# Patient Record
Sex: FEMALE | Race: WHITE | NOT HISPANIC OR LATINO | Employment: FULL TIME | URBAN - METROPOLITAN AREA
[De-identification: names, ages, dates, MRNs, and addresses within clinical notes are randomized per-mention and may not be internally consistent; named-entity substitution may affect disease eponyms.]

---

## 2017-04-11 ENCOUNTER — CONVERSION ENCOUNTER (OUTPATIENT)
Dept: ENDOCRINOLOGY | Facility: CLINIC | Age: 42
End: 2017-04-11

## 2017-05-24 ENCOUNTER — OFFICE VISIT (OUTPATIENT)
Dept: OBSTETRICS AND GYNECOLOGY | Facility: CLINIC | Age: 42
End: 2017-05-24

## 2017-05-24 VITALS
HEIGHT: 66 IN | DIASTOLIC BLOOD PRESSURE: 84 MMHG | BODY MASS INDEX: 27 KG/M2 | WEIGHT: 168 LBS | SYSTOLIC BLOOD PRESSURE: 120 MMHG

## 2017-05-24 DIAGNOSIS — B00.9 HSV-2 INFECTION: ICD-10-CM

## 2017-05-24 DIAGNOSIS — Z13.9 SCREENING: Primary | ICD-10-CM

## 2017-05-24 DIAGNOSIS — Z30.41 ORAL CONTRACEPTIVE USE: ICD-10-CM

## 2017-05-24 DIAGNOSIS — Z01.419 ENCOUNTER FOR GYNECOLOGICAL EXAMINATION WITHOUT ABNORMAL FINDING: ICD-10-CM

## 2017-05-24 PROBLEM — E10.9 TYPE 1 DIABETES MELLITUS: Status: ACTIVE | Noted: 2017-05-24

## 2017-05-24 LAB

## 2017-05-24 PROCEDURE — 99395 PREV VISIT EST AGE 18-39: CPT | Performed by: OBSTETRICS & GYNECOLOGY

## 2017-05-24 PROCEDURE — 81002 URINALYSIS NONAUTO W/O SCOPE: CPT | Performed by: OBSTETRICS & GYNECOLOGY

## 2017-05-24 PROCEDURE — 81025 URINE PREGNANCY TEST: CPT | Performed by: OBSTETRICS & GYNECOLOGY

## 2017-05-24 RX ORDER — NORGESTIMATE AND ETHINYL ESTRADIOL 7DAYSX3 28
1 KIT ORAL DAILY
Qty: 84 TABLET | Refills: 3 | Status: SHIPPED | OUTPATIENT
Start: 2017-05-24 | End: 2018-05-30 | Stop reason: SDUPTHER

## 2017-05-24 RX ORDER — ATORVASTATIN CALCIUM 10 MG/1
TABLET, FILM COATED ORAL
COMMUNITY
Start: 2017-04-11 | End: 2020-05-11 | Stop reason: SDUPTHER

## 2017-05-24 RX ORDER — NORGESTIMATE AND ETHINYL ESTRADIOL 7DAYSX3 28
KIT ORAL
COMMUNITY
Start: 2017-04-17 | End: 2017-05-24 | Stop reason: SDUPTHER

## 2017-05-26 LAB
CYTOLOGIST CVX/VAG CYTO: NORMAL
CYTOLOGY CVX/VAG DOC THIN PREP: NORMAL
DX ICD CODE: NORMAL
HIV 1 & 2 AB SER-IMP: NORMAL
HPV I/H RISK 1 DNA CVX QL PROBE+SIG AMP: NEGATIVE
OTHER STN SPEC: NORMAL
PATH REPORT.FINAL DX SPEC: NORMAL
STAT OF ADQ CVX/VAG CYTO-IMP: NORMAL

## 2017-06-02 ENCOUNTER — HOSPITAL ENCOUNTER (OUTPATIENT)
Dept: MAMMOGRAPHY | Facility: HOSPITAL | Age: 42
Discharge: HOME | End: 2017-06-02
Attending: OBSTETRICS & GYNECOLOGY | Admitting: OBSTETRICS & GYNECOLOGY

## 2017-06-02 DIAGNOSIS — Z13.9 SCREENING: ICD-10-CM

## 2017-06-02 PROCEDURE — G0202 SCR MAMMO BI INCL CAD: HCPCS

## 2017-06-08 DIAGNOSIS — R92.8 MAMMOGRAM ABNORMAL: Primary | ICD-10-CM

## 2017-06-15 ENCOUNTER — HOSPITAL ENCOUNTER (OUTPATIENT)
Dept: ULTRASOUND IMAGING | Facility: HOSPITAL | Age: 42
End: 2017-06-15
Attending: OBSTETRICS & GYNECOLOGY

## 2017-06-16 ENCOUNTER — TELEPHONE (OUTPATIENT)
Dept: OBSTETRICS AND GYNECOLOGY | Facility: CLINIC | Age: 42
End: 2017-06-16

## 2017-06-18 DIAGNOSIS — R92.8 MAMMOGRAM ABNORMAL: Primary | ICD-10-CM

## 2017-06-23 DIAGNOSIS — R92.8 MAMMOGRAM ABNORMAL: ICD-10-CM

## 2017-07-06 ENCOUNTER — HOSPITAL ENCOUNTER (OUTPATIENT)
Dept: OTHER | Facility: HOSPITAL | Age: 42
Setting detail: SPECIMEN
Discharge: HOME | End: 2017-07-06
Attending: RADIOLOGY | Admitting: RADIOLOGY

## 2017-07-23 ENCOUNTER — TELEPHONE (OUTPATIENT)
Dept: URGENT CARE | Facility: CLINIC | Age: 42
End: 2017-07-23

## 2017-07-23 DIAGNOSIS — N30.90 CYSTITIS: Primary | ICD-10-CM

## 2017-07-23 RX ORDER — CIPROFLOXACIN 500 MG/1
TABLET, FILM COATED ORAL
Qty: 14 TABLET | Refills: 0 | Status: SHIPPED | OUTPATIENT
Start: 2017-07-23 | End: 2017-08-23

## 2017-08-23 ENCOUNTER — OFFICE VISIT (OUTPATIENT)
Dept: SPORTS MEDICINE | Facility: CLINIC | Age: 42
End: 2017-08-23

## 2017-08-23 VITALS
HEART RATE: 85 BPM | SYSTOLIC BLOOD PRESSURE: 110 MMHG | RESPIRATION RATE: 16 BRPM | BODY MASS INDEX: 27.83 KG/M2 | DIASTOLIC BLOOD PRESSURE: 80 MMHG | OXYGEN SATURATION: 98 % | WEIGHT: 173.2 LBS | HEIGHT: 66 IN

## 2017-08-23 DIAGNOSIS — E10.69 HYPERLIPIDEMIA DUE TO TYPE 1 DIABETES MELLITUS (HCC): ICD-10-CM

## 2017-08-23 DIAGNOSIS — E10.9 TYPE 1 DIABETES MELLITUS WITHOUT COMPLICATION (HCC): Primary | ICD-10-CM

## 2017-08-23 DIAGNOSIS — E78.5 HYPERLIPIDEMIA DUE TO TYPE 1 DIABETES MELLITUS (HCC): ICD-10-CM

## 2017-08-23 PROCEDURE — 99204 OFFICE O/P NEW MOD 45 MIN: CPT | Performed by: FAMILY MEDICINE

## 2017-08-23 NOTE — PROGRESS NOTES
"Luis is a 42 y.o. year old female    Chief Complaint   Patient presents with   • Establish Care     Establishing a new pcp due to not being seen in 4 years.        History of Present Illness   HPI Comments: Type 1 diabetes: Diagnosed when she was 25 years old.  Has been on insulin pump.  Recalls her last A1c was 10.  Follows with endocrinology in [place] every 6 months.  Has eye exams regularly.    Hyperlipidemia: Due to diabetes.  On Lipitor 10 mg.  Due for blood work at her next endocrinology appointment.    Up-to-date on gynecologic exams.  Had breast biopsy performed earlier this year which was benign.       I have reviewed the patient's medical, family, and social history in detail and updated the computerized patient record.    Review of Systems   Constitutional: Negative for chills, fatigue and fever.   HENT: Negative for postnasal drip and sore throat.    Eyes: Negative for pain.   Respiratory: Negative for cough, chest tightness and wheezing.    Cardiovascular: Negative for chest pain.   Gastrointestinal: Negative.    Musculoskeletal: Negative for myalgias.   Skin: Negative for rash.   Neurological: Negative for numbness and headaches.   Psychiatric/Behavioral: Negative.    All other systems reviewed and are negative.      /80 (BP Location: Left arm, Patient Position: Sitting, Cuff Size: Adult)  Pulse 85  Resp 16  Ht 66\" (167.6 cm)  Wt 173 lb 3.2 oz (78.6 kg)  LMP 07/24/2017 (Approximate)  SpO2 98%  BMI 27.96 kg/m2     Physical Exam   Constitutional: She is oriented to person, place, and time. She appears well-developed and well-nourished.   HENT:   Head: Normocephalic and atraumatic.   Right Ear: External ear normal.   Left Ear: External ear normal.   Nose: Nose normal.   Mouth/Throat: Oropharynx is clear and moist.   Eyes: EOM are normal.   Neck: Normal range of motion.   Cardiovascular: Normal rate and regular rhythm.    Pulmonary/Chest: Effort normal and breath sounds normal. "   Neurological: She is alert and oriented to person, place, and time.   Skin: Skin is warm and dry. No rash noted.   Psychiatric: She has a normal mood and affect. Her behavior is normal.   Nursing note and vitals reviewed.       Diagnoses and all orders for this visit:    Type 1 diabetes mellitus without complication    Hyperlipidemia due to type 1 diabetes mellitus      Chronic conditions which are stable.  Asked her to fax me results of her upcoming lab work.  No changes to medications.  Did discuss vaccinations and importance of staying up-to-date on these.

## 2017-10-17 ENCOUNTER — CONVERSION ENCOUNTER (OUTPATIENT)
Dept: ENDOCRINOLOGY | Facility: CLINIC | Age: 42
End: 2017-10-17

## 2017-11-22 ENCOUNTER — OFFICE VISIT (OUTPATIENT)
Dept: SPORTS MEDICINE | Facility: CLINIC | Age: 42
End: 2017-11-22

## 2017-11-22 VITALS
HEART RATE: 91 BPM | WEIGHT: 175 LBS | TEMPERATURE: 98.7 F | DIASTOLIC BLOOD PRESSURE: 73 MMHG | SYSTOLIC BLOOD PRESSURE: 114 MMHG | OXYGEN SATURATION: 98 % | HEIGHT: 66 IN | BODY MASS INDEX: 28.12 KG/M2

## 2017-11-22 DIAGNOSIS — Z20.818 EXPOSURE TO STREP THROAT: Primary | ICD-10-CM

## 2017-11-22 DIAGNOSIS — J02.9 SORE THROAT: ICD-10-CM

## 2017-11-22 LAB
EXPIRATION DATE: NORMAL
INTERNAL CONTROL: NORMAL
Lab: NORMAL
S PYO AG THROAT QL: NEGATIVE

## 2017-11-22 PROCEDURE — 87880 STREP A ASSAY W/OPTIC: CPT | Performed by: FAMILY MEDICINE

## 2017-11-22 PROCEDURE — 99213 OFFICE O/P EST LOW 20 MIN: CPT | Performed by: FAMILY MEDICINE

## 2017-11-22 RX ORDER — AMOXICILLIN 875 MG/1
875 TABLET, COATED ORAL 2 TIMES DAILY
Qty: 20 TABLET | Refills: 0 | Status: SHIPPED | OUTPATIENT
Start: 2017-11-22 | End: 2018-07-17

## 2017-11-22 NOTE — PROGRESS NOTES
"Luis is a 42 y.o. year old female    Chief Complaint   Patient presents with   • Sore Throat       History of Present Illness   HPI   Past couple of days patient has had a sore throat on the right that is worse in the morning and evening, also some pain along the angle of the jaw on the right.  Patient's had some sinus congestion more so on the right.  No fever or chills.  Has had contact with strep.  No cough or shortness of breath.    I have reviewed the patient's medical, family, and social history in detail and updated the computerized patient record.    Review of Systems   Constitutional: Negative.    HENT: Positive for congestion, postnasal drip and sore throat. Negative for trouble swallowing.    Eyes: Negative.    Respiratory: Negative.    Cardiovascular: Negative.        /73  Pulse 91  Temp 98.7 °F (37.1 °C)  Ht 66\" (167.6 cm)  Wt 175 lb (79.4 kg)  SpO2 98%  BMI 28.25 kg/m2     Physical Exam   Constitutional: She is oriented to person, place, and time. She appears well-developed and well-nourished.   HENT:   Head: Normocephalic and atraumatic.   Serous fluid behind the right TM.  Inferior turbinate edema more so on the right than left.  Purulent postnasal drainage.  No erythema or exudates in the posterior oropharynx.   Eyes: Conjunctivae and EOM are normal. Pupils are equal, round, and reactive to light.   Neck: Neck supple. No thyromegaly present.   Cardiovascular: Normal rate, regular rhythm and normal heart sounds.    No peripheral edema   Pulmonary/Chest: Effort normal.   Lymphadenopathy:     She has no cervical adenopathy.   Neurological: She is alert and oriented to person, place, and time.   Skin: Skin is warm and dry.   Psychiatric: She has a normal mood and affect. Her behavior is normal.   Vitals reviewed.   rapid strep test negative.     Diagnoses and all orders for this visit:    Exposure to strep throat  -     POCT rapid strep A  -     amoxicillin (AMOXIL) 875 MG tablet; Take 1 " tablet by mouth 2 (Two) Times a Day.    Sore throat  -     amoxicillin (AMOXIL) 875 MG tablet; Take 1 tablet by mouth 2 (Two) Times a Day.          EMR Dragon/Transcription disclaimer:    Much of this encounter note is an electronic transcription/translation of spoken language to printed text.  The electronic translation of spoken language may permit erroneous, or at times, nonsensical words or phrases to be inadvertently transcribed.  Although I have reviewed the note for such errors some may still exist.

## 2018-04-17 ENCOUNTER — CONVERSION ENCOUNTER (OUTPATIENT)
Dept: ENDOCRINOLOGY | Facility: CLINIC | Age: 43
End: 2018-04-17

## 2018-05-30 RX ORDER — NORGESTIMATE AND ETHINYL ESTRADIOL 7DAYSX3 28
1 KIT ORAL DAILY
Qty: 84 TABLET | Refills: 0 | Status: SHIPPED | OUTPATIENT
Start: 2018-05-30 | End: 2018-08-21 | Stop reason: SDUPTHER

## 2018-07-17 ENCOUNTER — OFFICE VISIT (OUTPATIENT)
Dept: SPORTS MEDICINE | Facility: CLINIC | Age: 43
End: 2018-07-17

## 2018-07-17 VITALS
DIASTOLIC BLOOD PRESSURE: 82 MMHG | OXYGEN SATURATION: 99 % | WEIGHT: 185.6 LBS | BODY MASS INDEX: 29.83 KG/M2 | HEIGHT: 66 IN | HEART RATE: 88 BPM | SYSTOLIC BLOOD PRESSURE: 122 MMHG

## 2018-07-17 DIAGNOSIS — E10.9 TYPE 1 DIABETES MELLITUS WITHOUT COMPLICATION (HCC): ICD-10-CM

## 2018-07-17 DIAGNOSIS — Z00.00 ANNUAL PHYSICAL EXAM: Primary | ICD-10-CM

## 2018-07-17 PROCEDURE — 90732 PPSV23 VACC 2 YRS+ SUBQ/IM: CPT | Performed by: FAMILY MEDICINE

## 2018-07-17 PROCEDURE — 90471 IMMUNIZATION ADMIN: CPT | Performed by: FAMILY MEDICINE

## 2018-07-17 PROCEDURE — 90472 IMMUNIZATION ADMIN EACH ADD: CPT | Performed by: FAMILY MEDICINE

## 2018-07-17 PROCEDURE — 90715 TDAP VACCINE 7 YRS/> IM: CPT | Performed by: FAMILY MEDICINE

## 2018-07-17 PROCEDURE — 99396 PREV VISIT EST AGE 40-64: CPT | Performed by: FAMILY MEDICINE

## 2018-07-17 RX ORDER — PERPHENAZINE 16 MG/1
TABLET, FILM COATED ORAL
COMMUNITY
Start: 2018-06-04 | End: 2020-02-20 | Stop reason: SDUPTHER

## 2018-07-17 NOTE — PROGRESS NOTES
"Luis Wilcox is here today for an annual physical exam.     Eating a healthy diet. Exercising routinely.     DM1: still seeing endo. Pump. Last a1c April '18 7.1. Had full lab panel Oct '17 incl lipid panel.    Abnml MMG last yr. Had Bx which was neg. Needs f/up w/GYN.    I have reviewed the patient's medical, family, and social history in detail and updated the computerized patient record.    Screening history:  Breast cancer, Pap/pelvic - per GYN  Metabolic - last year    Health Maintenance   Topic Date Due   • URINE MICROALBUMIN  1977   • ANNUAL PHYSICAL  06/21/1980   • PNEUMOCOCCAL VACCINE (19-64 MEDIUM RISK) (1 of 1 - PPSV23) 06/21/1996   • TDAP/TD VACCINES (1 - Tdap) 06/21/1996   • DIABETIC FOOT EXAM  05/24/2017   • HEMOGLOBIN A1C  05/24/2017   • DIABETIC EYE EXAM  05/24/2017   • LIPID PANEL  08/23/2017   • INFLUENZA VACCINE  08/01/2018   • PAP SMEAR  05/24/2020       Review of Systems   Constitutional: Negative for chills, fatigue and fever.   HENT: Negative for postnasal drip and sore throat.    Eyes: Negative for pain.   Respiratory: Negative for cough, chest tightness and wheezing.    Cardiovascular: Negative for chest pain.   Gastrointestinal: Negative.    Musculoskeletal: Negative for myalgias.   Skin: Negative for rash.   Neurological: Negative for numbness and headaches.   Psychiatric/Behavioral: Negative.    All other systems reviewed and are negative.      /82   Pulse 88   Ht 167.6 cm (66\")   Wt 84.2 kg (185 lb 9.6 oz)   SpO2 99%   BMI 29.96 kg/m²      Physical Exam    Vital signs reviewed.  General appearance: No acute distress  Eyes: conjunctiva clear without erythema; pupils equally round and reactive  ENT: external ears and nose normal; hearing normal, oropharynx clear  Neck: supple; no thyromegaly  CV: normal rate and rhythm; no peripheral edema  Respiratory: normal respiratory effort; lungs clear to auscultation bilaterally  MSK: normal gait and station; no focal joint " deformity or swelling  Skin: no rash or wounds; normal turgor  Neuro: cranial nerves 2-12 grossly intact; normal sensation to light touch  Psych: mood and affect normal; recent and remote memory intact    Luis was seen today for annual exam.    Diagnoses and all orders for this visit:    Annual physical exam  -     Tdap Vaccine Greater Than or Equal To 8yo IM  -     Pneumococcal Polysaccharide Vaccine 23-Valent Greater Than or Equal To 3yo Subcutaneous / IM    Type 1 diabetes mellitus without complication (CMS/HCC)  -     Pneumococcal Polysaccharide Vaccine 23-Valent Greater Than or Equal To 3yo Subcutaneous / IM        Encourage healthy diet and exercise.  Encourage patient to stay up to date on screening examinations as indicated based on age and risk factors.    EMR Dragon/Transcription disclaimer:    Much of this encounter note is an electronic transcription/translation of spoken language to printed text.  The electronic translation of spoken language may permit erroneous, or at times, nonsensical words or phrases to be inadvertently transcribed.  Although I have reviewed the note for such errors some may still exist.

## 2018-07-18 LAB
APPEARANCE UR: CLEAR
BACTERIA #/AREA URNS HPF: ABNORMAL /[HPF]
BASOPHILS # BLD AUTO: 0.1 X10E3/UL (ref 0–0.2)
BASOPHILS NFR BLD AUTO: 1 %
BILIRUB UR QL STRIP: NEGATIVE
COLOR UR: YELLOW
EOSINOPHIL # BLD AUTO: 0.2 X10E3/UL (ref 0–0.4)
EOSINOPHIL NFR BLD AUTO: 2 %
EPI CELLS #/AREA URNS HPF: ABNORMAL /HPF
ERYTHROCYTE [DISTWIDTH] IN BLOOD BY AUTOMATED COUNT: 13.2 % (ref 12.3–15.4)
GLUCOSE UR QL: ABNORMAL
HCT VFR BLD AUTO: 42.2 % (ref 34–46.6)
HGB BLD-MCNC: 13.8 G/DL (ref 11.1–15.9)
HGB UR QL STRIP: ABNORMAL
IMM GRANULOCYTES # BLD: 0 X10E3/UL (ref 0–0.1)
IMM GRANULOCYTES NFR BLD: 0 %
KETONES UR QL STRIP: NEGATIVE
LEUKOCYTE ESTERASE UR QL STRIP: NEGATIVE
LYMPHOCYTES # BLD AUTO: 2.4 X10E3/UL (ref 0.7–3.1)
LYMPHOCYTES NFR BLD AUTO: 27 %
MCH RBC QN AUTO: 28.9 PG (ref 26.6–33)
MCHC RBC AUTO-ENTMCNC: 32.7 G/DL (ref 31.5–35.7)
MCV RBC AUTO: 88 FL (ref 79–97)
MICRO URNS: ABNORMAL
MONOCYTES # BLD AUTO: 0.6 X10E3/UL (ref 0.1–0.9)
MONOCYTES NFR BLD AUTO: 7 %
MUCOUS THREADS URNS QL MICRO: PRESENT
NEUTROPHILS # BLD AUTO: 5.8 X10E3/UL (ref 1.4–7)
NEUTROPHILS NFR BLD AUTO: 63 %
NITRITE UR QL STRIP: NEGATIVE
PH UR STRIP: 6 [PH] (ref 5–7.5)
PLATELET # BLD AUTO: 379 X10E3/UL (ref 150–379)
PROT UR QL STRIP: NEGATIVE
RBC # BLD AUTO: 4.78 X10E6/UL (ref 3.77–5.28)
RBC #/AREA URNS HPF: ABNORMAL /HPF
SP GR UR: 1.02 (ref 1–1.03)
URINALYSIS REFLEX: ABNORMAL
UROBILINOGEN UR STRIP-MCNC: 0.2 MG/DL (ref 0.2–1)
WBC # BLD AUTO: 9.1 X10E3/UL (ref 3.4–10.8)
WBC #/AREA URNS HPF: ABNORMAL /HPF

## 2018-08-21 RX ORDER — NORGESTIMATE AND ETHINYL ESTRADIOL 7DAYSX3 28
1 KIT ORAL DAILY
Qty: 84 TABLET | Refills: 0 | Status: SHIPPED | OUTPATIENT
Start: 2018-08-21 | End: 2018-08-22 | Stop reason: SDUPTHER

## 2018-08-22 ENCOUNTER — OFFICE VISIT (OUTPATIENT)
Dept: OBSTETRICS AND GYNECOLOGY | Facility: CLINIC | Age: 43
End: 2018-08-22

## 2018-08-22 VITALS
HEIGHT: 66 IN | SYSTOLIC BLOOD PRESSURE: 122 MMHG | BODY MASS INDEX: 29.89 KG/M2 | WEIGHT: 186 LBS | DIASTOLIC BLOOD PRESSURE: 80 MMHG

## 2018-08-22 DIAGNOSIS — Z13.9 SCREENING FOR CONDITION: Primary | ICD-10-CM

## 2018-08-22 DIAGNOSIS — Z30.41 ORAL CONTRACEPTIVE USE: ICD-10-CM

## 2018-08-22 DIAGNOSIS — Z01.411 ENCOUNTER FOR GYNECOLOGICAL EXAMINATION WITH ABNORMAL FINDING: ICD-10-CM

## 2018-08-22 LAB
BILIRUB BLD-MCNC: NEGATIVE MG/DL
CLARITY, POC: CLEAR
COLOR UR: YELLOW
GLUCOSE UR STRIP-MCNC: NEGATIVE MG/DL
KETONES UR QL: NEGATIVE
LEUKOCYTE EST, POC: NEGATIVE
NITRITE UR-MCNC: NEGATIVE MG/ML
PH UR: 5 [PH] (ref 5–8)
PROT UR STRIP-MCNC: NEGATIVE MG/DL
RBC # UR STRIP: NEGATIVE /UL
SP GR UR: 1 (ref 1–1.03)
UROBILINOGEN UR QL: NORMAL

## 2018-08-22 PROCEDURE — 81002 URINALYSIS NONAUTO W/O SCOPE: CPT | Performed by: OBSTETRICS & GYNECOLOGY

## 2018-08-22 PROCEDURE — 99396 PREV VISIT EST AGE 40-64: CPT | Performed by: OBSTETRICS & GYNECOLOGY

## 2018-08-22 RX ORDER — NORGESTIMATE AND ETHINYL ESTRADIOL 7DAYSX3 28
1 KIT ORAL DAILY
Qty: 84 TABLET | Refills: 3 | Status: SHIPPED | OUTPATIENT
Start: 2018-08-22 | End: 2019-05-30 | Stop reason: SDUPTHER

## 2018-08-22 NOTE — PROGRESS NOTES
GYN Annual Exam     CC- Here for annual exam.     Luis Wilcox is a 43 y.o. female established patient who presents for annual well woman exam. Periods are regular every 28-30 days, lasting 3 days. She likes OCPs and wants to stay on them. She had a B9 breast biopsy last year. It was a fibroma and is quite large but she does not want it removed. I encouraged her to get it removed as is may continue to grow and removal may cause a large scar and it may also obscure other, non benign breast disease. She said she wants to avoid removal at this time.     OB History      Para Term  AB Living    2 2 2     2    SAB TAB Ectopic Molar Multiple Live Births                       Obstetric Comments    2           Menarche: 13  Current contraception: OCP (estrogen/progesterone)  History of abnormal Pap smear: yes - h/o normal pap + HPV , f/u normal x 2 in 2017  History of abnormal mammogram: yes - s/p B9 breast bx  Family history of uterine, colon or ovarian cancer: no  Family history of breast cancer: no  H/o STDs: none  Gardasil:  HSV 2    Health Maintenance   Topic Date Due   • URINE MICROALBUMIN  1977   • DIABETIC FOOT EXAM  2017   • HEMOGLOBIN A1C  2017   • DIABETIC EYE EXAM  2017   • LIPID PANEL  2017   • INFLUENZA VACCINE  2018   • ANNUAL PHYSICAL  2019   • PAP SMEAR  2020   • TDAP/TD VACCINES (2 - Td) 2028   • PNEUMOCOCCAL VACCINE (19-64 MEDIUM RISK)  Completed       Past Medical History:   Diagnosis Date   • Abnormal Pap smear of cervix     normal pap + HPV ,  both were normal   • Diabetes mellitus (CMS/HCC)    • Herpes     HSV 2   • HPV (human papilloma virus) infection    • Hyperlipidemia        History reviewed. No pertinent surgical history.      Current Outpatient Prescriptions:   •  atorvastatin (LIPITOR) 10 MG tablet, , Disp: , Rfl:   •  CONTOUR NEXT TEST test strip, , Disp: , Rfl:   •  HUMALOG 100 UNIT/ML injection, , Disp: ,  "Rfl:   •  norgestimate-ethinyl estradiol (TRI-SPRINTEC) 0.18/0.215/0.25 MG-35 MCG per tablet, Take 1 tablet by mouth Daily., Disp: 84 tablet, Rfl: 3    No Known Allergies    Social History   Substance Use Topics   • Smoking status: Never Smoker   • Smokeless tobacco: Never Used   • Alcohol use No       Family History   Problem Relation Age of Onset   • Hyperlipidemia Father    • Diabetes Paternal Grandfather    • Stroke Paternal Grandfather    • No Known Problems Mother    • No Known Problems Brother    • No Known Problems Daughter    • No Known Problems Daughter    • Breast cancer Neg Hx    • Ovarian cancer Neg Hx    • Colon cancer Neg Hx    • Deep vein thrombosis Neg Hx    • Pulmonary embolism Neg Hx        Review of Systems   Constitutional: Negative for appetite change, fatigue, fever and unexpected weight change.   Respiratory: Negative for cough and shortness of breath.    Cardiovascular: Negative for chest pain and palpitations.   Gastrointestinal: Negative for abdominal distention, abdominal pain, constipation, diarrhea and nausea.   Endocrine: Negative for cold intolerance and heat intolerance.   Genitourinary: Negative for dyspareunia, dysuria, menstrual problem, pelvic pain and vaginal discharge.   Skin: Negative for color change and rash.   Neurological: Negative for headaches.   Psychiatric/Behavioral: Negative for dysphoric mood. The patient is not nervous/anxious.        /80   Ht 167.6 cm (66\")   Wt 84.4 kg (186 lb)   LMP 08/16/2018   BMI 30.02 kg/m²     Physical Exam   Constitutional: She is oriented to person, place, and time. She appears well-developed and well-nourished.   HENT:   Head: Normocephalic and atraumatic.   Neck: No thyromegaly present.   Cardiovascular: Normal rate, regular rhythm and normal heart sounds.    Pulmonary/Chest: Effort normal and breath sounds normal. Right breast exhibits mass. Right breast exhibits no inverted nipple, no nipple discharge, no skin change and no " tenderness. Left breast exhibits no inverted nipple, no mass, no nipple discharge, no skin change and no tenderness.       Abdominal: Soft. Bowel sounds are normal. She exhibits no distension and no mass. There is no tenderness. No hernia.   Genitourinary: Pelvic exam was performed with patient supine. There is no rash, tenderness or lesion on the right labia. There is no rash, tenderness or lesion on the left labia. Uterus is not deviated, not enlarged, not fixed and not tender. Cervix exhibits no motion tenderness, no discharge and no friability. Right adnexum displays no mass, no tenderness and no fullness. Left adnexum displays no mass, no tenderness and no fullness. No erythema, tenderness or bleeding in the vagina. No foreign body in the vagina. No signs of injury around the vagina. No vaginal discharge found.   Neurological: She is oriented to person, place, and time.   Skin: Skin is warm and dry.   Psychiatric: She has a normal mood and affect. Her behavior is normal. Judgment and thought content normal.   Nursing note and vitals reviewed.         Assessment/Plan    1) GYN HM: normal pap/HPV 2017   SBE demonstrated and encouraged.  2) STD screening: declines Condoms encouraged.  3) Contraception: refill OCPS.   4) Family Planning: no plans, encourage folic acid daily  5) Diet and Exercise discussed  6) Smoking Status: non smoker  7) R breast mass- s/p B9 biopsy. Enc pt to have whole mass removed and she declines.  8) MMG-  Schedule.   9)Follow up prn or 1 year       Luis was seen today for gynecologic exam.    Diagnoses and all orders for this visit:    Screening for condition  -     POC Urinalysis Dipstick  -     Mammo Screening Bilateral With CAD; Future    Encounter for gynecological examination with abnormal finding    Oral contraceptive use    Other orders  -     norgestimate-ethinyl estradiol (TRI-SPRINTEC) 0.18/0.215/0.25 MG-35 MCG per tablet; Take 1 tablet by mouth Daily.          Carol Aguilar  MD Abdoul  8/26/2018  8:33 PM

## 2018-10-16 ENCOUNTER — CONVERSION ENCOUNTER (OUTPATIENT)
Dept: ENDOCRINOLOGY | Facility: CLINIC | Age: 43
End: 2018-10-16

## 2018-10-31 ENCOUNTER — OFFICE VISIT (OUTPATIENT)
Dept: SPORTS MEDICINE | Facility: CLINIC | Age: 43
End: 2018-10-31

## 2018-10-31 VITALS
DIASTOLIC BLOOD PRESSURE: 80 MMHG | HEART RATE: 79 BPM | WEIGHT: 186 LBS | OXYGEN SATURATION: 99 % | SYSTOLIC BLOOD PRESSURE: 120 MMHG | BODY MASS INDEX: 29.89 KG/M2 | TEMPERATURE: 98.2 F | HEIGHT: 66 IN

## 2018-10-31 DIAGNOSIS — R31.9 URINARY TRACT INFECTION WITH HEMATURIA, SITE UNSPECIFIED: Primary | ICD-10-CM

## 2018-10-31 DIAGNOSIS — N39.0 URINARY TRACT INFECTION WITH HEMATURIA, SITE UNSPECIFIED: Primary | ICD-10-CM

## 2018-10-31 LAB
BILIRUB BLD-MCNC: NEGATIVE MG/DL
CLARITY, POC: ABNORMAL
COLOR UR: ABNORMAL
GLUCOSE UR STRIP-MCNC: NEGATIVE MG/DL
KETONES UR QL: NEGATIVE
LEUKOCYTE EST, POC: ABNORMAL
NITRITE UR-MCNC: POSITIVE MG/ML
PH UR: 6 [PH] (ref 5–8)
PROT UR STRIP-MCNC: NEGATIVE MG/DL
RBC # UR STRIP: ABNORMAL /UL
SP GR UR: 1.01 (ref 1–1.03)
UROBILINOGEN UR QL: NORMAL

## 2018-10-31 PROCEDURE — 81003 URINALYSIS AUTO W/O SCOPE: CPT | Performed by: FAMILY MEDICINE

## 2018-10-31 PROCEDURE — 99214 OFFICE O/P EST MOD 30 MIN: CPT | Performed by: FAMILY MEDICINE

## 2018-10-31 RX ORDER — SULFAMETHOXAZOLE AND TRIMETHOPRIM 800; 160 MG/1; MG/1
1 TABLET ORAL 2 TIMES DAILY
Qty: 14 TABLET | Refills: 0 | Status: SHIPPED | OUTPATIENT
Start: 2018-10-31 | End: 2018-11-07

## 2018-10-31 NOTE — PROGRESS NOTES
"Luis is a 43 y.o. year old female    Chief Complaint   Patient presents with   • Urinary Tract Infection       History of Present Illness   H/o UTI, last documented 7/18. Cx last yr E. Coli, murguia sensitive.      Urinary Tract Infection    This is a new problem. The current episode started 1 to 4 weeks ago. The problem occurs every urination. The problem has been unchanged. There has been no fever. Associated symptoms include flank pain (L), frequency and urgency. Pertinent negatives include no chills.        I have reviewed the patient's medical, family, and social history in detail and updated the computerized patient record.    Review of Systems   Constitutional: Negative for chills, fatigue and fever.   HENT: Negative for congestion, rhinorrhea and sinus pressure.    Respiratory: Negative for chest tightness and shortness of breath.    Cardiovascular: Negative for chest pain.   Gastrointestinal: Negative for abdominal pain.   Genitourinary: Positive for flank pain (L), frequency and urgency.   Musculoskeletal: Negative for arthralgias.   Skin: Negative for rash.   Neurological: Negative for numbness and headaches.   All other systems reviewed and are negative.      /80   Pulse 79   Temp 98.2 °F (36.8 °C)   Ht 167.6 cm (65.98\")   Wt 84.4 kg (186 lb)   SpO2 99%   BMI 30.04 kg/m²      Physical Exam   Constitutional: She is oriented to person, place, and time. Vital signs are normal. She appears well-developed and well-nourished.   HENT:   Head: Normocephalic and atraumatic.   Eyes: Conjunctivae and EOM are normal.   Pulmonary/Chest: No accessory muscle usage. No respiratory distress.   Abdominal: There is tenderness in the suprapubic area. There is no CVA tenderness.   Musculoskeletal: She exhibits no deformity.   Neurological: She is alert and oriented to person, place, and time.   Skin: Skin is warm and dry. No rash noted.   Psychiatric: She has a normal mood and affect. Her behavior is normal. "   Nursing note and vitals reviewed.       Diagnoses and all orders for this visit:    Urinary tract infection with hematuria, site unspecified  -     POCT urinalysis dipstick, automated  -     Cancel: Urine Culture - Urine, Urine, Clean Catch  -     sulfamethoxazole-trimethoprim (BACTRIM DS) 800-160 MG per tablet; Take 1 tablet by mouth 2 (Two) Times a Day for 7 days.             EMR Dragon/Transcription disclaimer:    Much of this encounter note is an electronic transcription/translation of spoken language to printed text.  The electronic translation of spoken language may permit erroneous, or at times, nonsensical words or phrases to be inadvertently transcribed.  Although I have reviewed the note for such errors some may still exist.

## 2018-11-06 RX ORDER — NORGESTIMATE AND ETHINYL ESTRADIOL 7DAYSX3 28
1 KIT ORAL DAILY
Qty: 84 TABLET | Refills: 0 | Status: SHIPPED | OUTPATIENT
Start: 2018-11-06 | End: 2019-01-10 | Stop reason: SDUPTHER

## 2019-01-11 RX ORDER — NORGESTIMATE AND ETHINYL ESTRADIOL 7DAYSX3 28
1 KIT ORAL DAILY
Qty: 84 TABLET | Refills: 1 | Status: SHIPPED | OUTPATIENT
Start: 2019-01-11 | End: 2019-05-30 | Stop reason: SDUPTHER

## 2019-04-10 LAB — HBA1C MFR BLD: 7.7 % (ref 4.8–5.6)

## 2019-05-15 ENCOUNTER — CONVERSION ENCOUNTER (OUTPATIENT)
Dept: ENDOCRINOLOGY | Facility: CLINIC | Age: 44
End: 2019-05-15

## 2019-05-30 RX ORDER — NORGESTIMATE AND ETHINYL ESTRADIOL 7DAYSX3 28
1 KIT ORAL DAILY
Qty: 84 TABLET | Refills: 0 | Status: SHIPPED | OUTPATIENT
Start: 2019-05-30 | End: 2019-06-30 | Stop reason: SDUPTHER

## 2019-06-04 VITALS
BODY MASS INDEX: 30.05 KG/M2 | HEART RATE: 78 BPM | HEIGHT: 66 IN | WEIGHT: 187 LBS | DIASTOLIC BLOOD PRESSURE: 80 MMHG | SYSTOLIC BLOOD PRESSURE: 132 MMHG | OXYGEN SATURATION: 98 %

## 2019-06-04 VITALS
HEART RATE: 77 BPM | HEIGHT: 66 IN | HEIGHT: 66 IN | SYSTOLIC BLOOD PRESSURE: 112 MMHG | OXYGEN SATURATION: 99 % | DIASTOLIC BLOOD PRESSURE: 78 MMHG | BODY MASS INDEX: 29.57 KG/M2 | SYSTOLIC BLOOD PRESSURE: 140 MMHG | WEIGHT: 178 LBS | SYSTOLIC BLOOD PRESSURE: 122 MMHG | SYSTOLIC BLOOD PRESSURE: 122 MMHG | BODY MASS INDEX: 28.57 KG/M2 | DIASTOLIC BLOOD PRESSURE: 80 MMHG | WEIGHT: 165 LBS | DIASTOLIC BLOOD PRESSURE: 78 MMHG | BODY MASS INDEX: 28.61 KG/M2 | WEIGHT: 184 LBS | WEIGHT: 177 LBS | DIASTOLIC BLOOD PRESSURE: 98 MMHG | HEART RATE: 88 BPM | HEART RATE: 84 BPM | HEART RATE: 83 BPM | OXYGEN SATURATION: 98 %

## 2019-07-01 RX ORDER — NORGESTIMATE AND ETHINYL ESTRADIOL 7DAYSX3 28
KIT ORAL
Qty: 84 TABLET | Refills: 0 | Status: SHIPPED | OUTPATIENT
Start: 2019-07-01 | End: 2019-08-28 | Stop reason: SDUPTHER

## 2019-08-28 ENCOUNTER — OFFICE VISIT (OUTPATIENT)
Dept: OBSTETRICS AND GYNECOLOGY | Facility: CLINIC | Age: 44
End: 2019-08-28

## 2019-08-28 ENCOUNTER — PROCEDURE VISIT (OUTPATIENT)
Dept: OBSTETRICS AND GYNECOLOGY | Facility: CLINIC | Age: 44
End: 2019-08-28

## 2019-08-28 VITALS
BODY MASS INDEX: 31.18 KG/M2 | WEIGHT: 194 LBS | SYSTOLIC BLOOD PRESSURE: 132 MMHG | DIASTOLIC BLOOD PRESSURE: 84 MMHG | HEIGHT: 66 IN

## 2019-08-28 DIAGNOSIS — Z01.419 PAP SMEAR, LOW-RISK: Primary | ICD-10-CM

## 2019-08-28 DIAGNOSIS — Z01.411 ENCOUNTER FOR GYNECOLOGICAL EXAMINATION WITH ABNORMAL FINDING: ICD-10-CM

## 2019-08-28 DIAGNOSIS — Z13.9 SCREENING FOR CONDITION: ICD-10-CM

## 2019-08-28 DIAGNOSIS — B00.9 HSV-2 INFECTION: ICD-10-CM

## 2019-08-28 DIAGNOSIS — N93.8 DUB (DYSFUNCTIONAL UTERINE BLEEDING): ICD-10-CM

## 2019-08-28 DIAGNOSIS — N63.0 BREAST MASS IN FEMALE: ICD-10-CM

## 2019-08-28 DIAGNOSIS — Z11.51 SPECIAL SCREENING EXAMINATION FOR HUMAN PAPILLOMAVIRUS (HPV): ICD-10-CM

## 2019-08-28 DIAGNOSIS — N91.5 OLIGOMENORRHEA, UNSPECIFIED TYPE: Primary | ICD-10-CM

## 2019-08-28 DIAGNOSIS — R14.0 BLOATING: ICD-10-CM

## 2019-08-28 DIAGNOSIS — Z01.419 ENCOUNTER FOR WELL WOMAN EXAM: ICD-10-CM

## 2019-08-28 DIAGNOSIS — Z30.41 ORAL CONTRACEPTIVE USE: ICD-10-CM

## 2019-08-28 LAB
B-HCG UR QL: NEGATIVE
BILIRUB BLD-MCNC: NEGATIVE MG/DL
CLARITY, POC: CLEAR
COLOR UR: YELLOW
GLUCOSE UR STRIP-MCNC: NEGATIVE MG/DL
INTERNAL NEGATIVE CONTROL: NEGATIVE
INTERNAL POSITIVE CONTROL: POSITIVE
KETONES UR QL: NEGATIVE
LEUKOCYTE EST, POC: NEGATIVE
Lab: NORMAL
NITRITE UR-MCNC: NEGATIVE MG/ML
PH UR: 5 [PH] (ref 5–8)
PROT UR STRIP-MCNC: NEGATIVE MG/DL
RBC # UR STRIP: NEGATIVE /UL
SP GR UR: 1 (ref 1–1.03)
UROBILINOGEN UR QL: NORMAL

## 2019-08-28 PROCEDURE — 81002 URINALYSIS NONAUTO W/O SCOPE: CPT | Performed by: OBSTETRICS & GYNECOLOGY

## 2019-08-28 PROCEDURE — 81025 URINE PREGNANCY TEST: CPT | Performed by: OBSTETRICS & GYNECOLOGY

## 2019-08-28 PROCEDURE — 99213 OFFICE O/P EST LOW 20 MIN: CPT | Performed by: OBSTETRICS & GYNECOLOGY

## 2019-08-28 PROCEDURE — 99396 PREV VISIT EST AGE 40-64: CPT | Performed by: OBSTETRICS & GYNECOLOGY

## 2019-08-28 PROCEDURE — 76830 TRANSVAGINAL US NON-OB: CPT | Performed by: OBSTETRICS & GYNECOLOGY

## 2019-08-28 RX ORDER — NORGESTIMATE AND ETHINYL ESTRADIOL 7DAYSX3 28
1 KIT ORAL DAILY
Qty: 84 TABLET | Refills: 3 | Status: SHIPPED | OUTPATIENT
Start: 2019-08-28 | End: 2020-08-24

## 2019-08-28 RX ORDER — MEDROXYPROGESTERONE ACETATE 10 MG/1
10 TABLET ORAL DAILY
Qty: 7 TABLET | Refills: 0 | Status: SHIPPED | OUTPATIENT
Start: 2019-08-28 | End: 2019-09-04

## 2019-08-30 LAB
CYTOLOGIST CVX/VAG CYTO: NORMAL
CYTOLOGY CVX/VAG DOC CYTO: NORMAL
CYTOLOGY CVX/VAG DOC THIN PREP: NORMAL
DX ICD CODE: NORMAL
HIV 1 & 2 AB SER-IMP: NORMAL
HPV I/H RISK 1 DNA CVX QL PROBE+SIG AMP: NEGATIVE
OTHER STN SPEC: NORMAL
STAT OF ADQ CVX/VAG CYTO-IMP: NORMAL

## 2019-11-07 PROBLEM — E78.5 HYPERLIPIDEMIA: Status: ACTIVE | Noted: 2017-04-11

## 2019-11-07 PROBLEM — Z83.3 FAMILY HISTORY OF DIABETES MELLITUS: Status: ACTIVE | Noted: 2019-11-07

## 2019-11-11 ENCOUNTER — TELEPHONE (OUTPATIENT)
Dept: ENDOCRINOLOGY | Facility: CLINIC | Age: 44
End: 2019-11-11

## 2019-11-11 ENCOUNTER — OFFICE VISIT (OUTPATIENT)
Dept: ENDOCRINOLOGY | Facility: CLINIC | Age: 44
End: 2019-11-11

## 2019-11-11 VITALS
HEIGHT: 66 IN | HEART RATE: 90 BPM | OXYGEN SATURATION: 98 % | WEIGHT: 193 LBS | BODY MASS INDEX: 31.02 KG/M2 | DIASTOLIC BLOOD PRESSURE: 82 MMHG | SYSTOLIC BLOOD PRESSURE: 132 MMHG

## 2019-11-11 DIAGNOSIS — Z96.41 INSULIN PUMP STATUS: ICD-10-CM

## 2019-11-11 DIAGNOSIS — E78.2 MIXED HYPERLIPIDEMIA: ICD-10-CM

## 2019-11-11 DIAGNOSIS — E10.9 TYPE 1 DIABETES MELLITUS WITHOUT COMPLICATION (HCC): Primary | ICD-10-CM

## 2019-11-11 DIAGNOSIS — E55.9 VITAMIN D DEFICIENCY: ICD-10-CM

## 2019-11-11 LAB — GLUCOSE BLDC GLUCOMTR-MCNC: 155 MG/DL (ref 70–130)

## 2019-11-11 PROCEDURE — 99214 OFFICE O/P EST MOD 30 MIN: CPT | Performed by: INTERNAL MEDICINE

## 2019-11-11 PROCEDURE — 82962 GLUCOSE BLOOD TEST: CPT | Performed by: INTERNAL MEDICINE

## 2019-11-11 NOTE — PATIENT INSTRUCTIONS
Increase exercise as planned.  Get flu shot.  Continue same pump settings.  Continue meds.  F/u in 6 months, with labs prior.

## 2019-11-21 RX ORDER — VALACYCLOVIR HYDROCHLORIDE 500 MG/1
500 TABLET, FILM COATED ORAL DAILY
Qty: 30 TABLET | Refills: 0 | Status: SHIPPED | OUTPATIENT
Start: 2019-11-21 | End: 2019-12-21

## 2019-11-21 NOTE — PROGRESS NOTES
[facility]        Patient Care Team:  Jarad Valente Sr., MD as PCP - General  Carol Schreiber MD as Consulting Physician (Obstetrics and Gynecology)  Neyda Green MD as Consulting Physician (Endocrinology)    Chief Complaint:    Chief Complaint   Patient presents with   • Diabetes     type 1 12a 1.0, 6a 0.95, 2p 0.90, 8p 1.0         Subjective   Here for diabetes f/u  Blood sugars in the 200's  Checks blood sugars 4x/d for the last 3 months. Adjusts insulin dose based on results  Insulin delivery per pump: Basal 44% / bolus 56%  Off sensors due to cost  Just restarted taking atorvastatin  Exercise program: not much    Interval History:     Patient Complaints: none  Patient Denies:  hypoglycemia  History taken from: patient    Review of Systems:   Review of Systems   Eyes: Negative for blurred vision.   Gastrointestinal: Negative for nausea.   Endocrine: Negative for polyuria.   Neurological: Negative for headache.     Gained 6 lb since last visit    Objective     Vital Signs      Vitals:    11/11/19 1303   BP: 132/82   Pulse: 90   SpO2: 98%         Physical Exam:     General Appearance:    Alert, cooperative, in no acute distress. Obese   Head:    Normocephalic, without obvious abnormality, atraumatic   Eyes:            Lids and lashes normal, conjunctivae and sclerae normal, no   icterus, no pallor, corneas clear, PERRLA   Throat:   No oral lesions,  oral mucosa moist   Neck:   No adenopathy, supple,  no thyromegaly, no   carotid bruit   Lungs:     Clear     Heart:    Regular rhythm and normal rate   Chest Wall:    No abnormalities observed   Abdomen:     Normal bowel sounds, soft                 Extremities:   Ecchymosis 4thtoe jazmin from wearing boots, no edema               Pulses:   Pulses palpable and equal bilaterally   Skin:   Pump site clean   Neurologic:  DTR 1+, able to feel the 10g monofilament          Results Review:    I have reviewed the patient's new  clinical results, labs & imaging.    Medication Review:   Prior to Admission medications    Medication Sig Start Date End Date Taking? Authorizing Provider   atorvastatin (LIPITOR) 10 MG tablet  4/11/17  Yes ProviderLexie MD   CONTOUR NEXT TEST test strip Use to check blood sugar four times daily 6/4/18  Yes Lexie Pham MD   HUMALOG 100 UNIT/ML injection Inject a maximum daily dose of 60 units subcutaneously via insulin pump dx:[id number] 9/16/19  Yes Neyda Green MD   norgestimate-ethinyl estradiol (TRI-SPRINTEC) 0.18/0.215/0.25 MG-35 MCG per tablet Take 1 tablet by mouth Daily. 8/28/19  Yes Carol Schreiber MD       Lab Results (most recent)     Procedure Component Value Units Date/Time    POC Glucose Fingerstick [[id number]]  (Abnormal) Collected:  11/11/19 1300    Specimen:  Blood Updated:  11/11/19 1301     Glucose 155 mg/dL      Comment: ate@ 8am this morning, 5 hours ago           A1C 8.9%, microalb/cr ratio 3.7, cr 0.76, K 4.6, ALT 14; TSH 3.04; Chol 210, Trigl 160 on 11/1/2019    Lab Results   Component Value Date    HGBA1C 7.7 (H) 04/09/2019      Assessment/Plan     Luis was seen today for diabetes.    Diagnoses and all orders for this visit:    Type 1 diabetes mellitus without complication (CMS/[facility])  -     POC Glucose Fingerstick  -     Hemoglobin A1c; Future    Mixed hyperlipidemia    Insulin pump status    Vitamin D deficiency  -     Vitamin D 25 Hydroxy; Future    Wearing Minimed 670 pump since 8/2017. Not using sensors.     Increase exercise as planned.  Get flu shot.  Continue same pump settings.  Continue meds.        Neyda Green MD  11/21/19  3:44 PM

## 2019-11-21 NOTE — TELEPHONE ENCOUNTER
Pt is having an HSV2 outbreak is going out of town tomorrow. Can we call this in to the [facility] pharmacy?

## 2020-01-07 ENCOUNTER — TELEPHONE (OUTPATIENT)
Dept: ENDOCRINOLOGY | Facility: CLINIC | Age: 45
End: 2020-01-07

## 2020-02-20 DIAGNOSIS — E10.9 TYPE 1 DIABETES MELLITUS WITHOUT COMPLICATION (HCC): Primary | ICD-10-CM

## 2020-02-20 RX ORDER — PERPHENAZINE 16 MG/1
TABLET, FILM COATED ORAL
Qty: 450 EACH | Refills: 3 | Status: SHIPPED | OUTPATIENT
Start: 2020-02-20 | End: 2021-08-26 | Stop reason: SDUPTHER

## 2020-02-27 ENCOUNTER — TELEPHONE (OUTPATIENT)
Dept: ENDOCRINOLOGY | Facility: CLINIC | Age: 45
End: 2020-02-27

## 2020-02-27 NOTE — TELEPHONE ENCOUNTER
Test strips approved by [place]  [facility] ( [phone]) ref id # [id number] from 2/27/20-2/26/21. Patient notified.

## 2020-05-06 LAB
25(OH)D3+25(OH)D2 SERPL-MCNC: 21.8 NG/ML (ref 30–100)
HBA1C MFR BLD: 9.1 % (ref 4.8–5.6)

## 2020-05-09 ENCOUNTER — RESULTS ENCOUNTER (OUTPATIENT)
Dept: ENDOCRINOLOGY | Facility: CLINIC | Age: 45
End: 2020-05-09

## 2020-05-09 DIAGNOSIS — E55.9 VITAMIN D DEFICIENCY: ICD-10-CM

## 2020-05-09 DIAGNOSIS — E10.9 TYPE 1 DIABETES MELLITUS WITHOUT COMPLICATION (HCC): ICD-10-CM

## 2020-05-11 ENCOUNTER — OFFICE VISIT (OUTPATIENT)
Dept: ENDOCRINOLOGY | Facility: CLINIC | Age: 45
End: 2020-05-11

## 2020-05-11 ENCOUNTER — TELEPHONE (OUTPATIENT)
Dept: ENDOCRINOLOGY | Facility: CLINIC | Age: 45
End: 2020-05-11

## 2020-05-11 VITALS
DIASTOLIC BLOOD PRESSURE: 82 MMHG | WEIGHT: 189 LBS | SYSTOLIC BLOOD PRESSURE: 125 MMHG | OXYGEN SATURATION: 96 % | HEIGHT: 66 IN | HEART RATE: 79 BPM | BODY MASS INDEX: 30.37 KG/M2

## 2020-05-11 DIAGNOSIS — Z96.41 INSULIN PUMP STATUS: ICD-10-CM

## 2020-05-11 DIAGNOSIS — E55.9 VITAMIN D DEFICIENCY: ICD-10-CM

## 2020-05-11 DIAGNOSIS — E10.9 TYPE 1 DIABETES MELLITUS WITHOUT COMPLICATION (HCC): Primary | ICD-10-CM

## 2020-05-11 DIAGNOSIS — E78.2 MIXED HYPERLIPIDEMIA: ICD-10-CM

## 2020-05-11 LAB — GLUCOSE BLDC GLUCOMTR-MCNC: 170 MG/DL (ref 70–130)

## 2020-05-11 PROCEDURE — 99214 OFFICE O/P EST MOD 30 MIN: CPT | Performed by: INTERNAL MEDICINE

## 2020-05-11 PROCEDURE — 82962 GLUCOSE BLOOD TEST: CPT | Performed by: INTERNAL MEDICINE

## 2020-05-11 RX ORDER — ATORVASTATIN CALCIUM 10 MG/1
10 TABLET, FILM COATED ORAL DAILY
Qty: 90 TABLET | Refills: 3 | Status: SHIPPED | OUTPATIENT
Start: 2020-05-11 | End: 2020-11-10

## 2020-05-11 RX ORDER — VALACYCLOVIR HYDROCHLORIDE 500 MG/1
500 TABLET, FILM COATED ORAL DAILY
COMMUNITY
Start: 2020-03-30 | End: 2021-11-04 | Stop reason: SDUPTHER

## 2020-05-11 RX ORDER — ERGOCALCIFEROL 1.25 MG/1
50000 CAPSULE ORAL WEEKLY
Qty: 12 CAPSULE | Refills: 3 | Status: SHIPPED | OUTPATIENT
Start: 2020-05-11 | End: 2021-07-01

## 2020-05-11 NOTE — PATIENT INSTRUCTIONS
See eye doctor in July as scheduled.  Increase exercise as planned.  Take vit D 50,000 one weekly.  Schedule MNT. Bring 2 d food records to appt.  Change ICR to 1:6 all day.  Call if blood sugars are running under 100 or over 200.  F/u in 6 months, with fasting labs prior.

## 2020-05-14 NOTE — PROGRESS NOTES
[facility]        Patient Care Team:  Jarad Valente Sr., MD as PCP - General  SchreiberCarol stock MD as Consulting Physician (Obstetrics and Gynecology)  Neyda Green MD as Consulting Physician (Endocrinology)    Chief Complaint:    Chief Complaint   Patient presents with   • Diabetes     Type 1 / Basal 12-AM 1, 6-2PM .95, 2-8PM .90, 8-12AM 1.0         Subjective   Here for diabetes f/u  Blood sugars: higher in the evenings  Exercise program: not much  Due for eye exam    Interval History:     Patient Complaints: bloated  Patient Denies:  hypoglycemia  History taken from: patient    Review of Systems:   Review of Systems   Eyes: Negative for blurred vision.   Gastrointestinal: Negative for nausea.   Endocrine: Negative for polyuria.   Neurological: Negative for headache.     Lost  4 lb since last visit    Objective     Vital Signs      Vitals:    05/11/20 1322   BP: 125/82   Pulse: 79   SpO2: 96%         Physical Exam:     General Appearance:    Alert, cooperative, in no acute distress. Obese   Head:    Normocephalic, without obvious abnormality, atraumatic   Eyes:            Lids and lashes normal, conjunctivae and sclerae normal, no   icterus, no pallor, corneas clear, PERRLA   Throat:   No oral lesions,  oral mucosa moist   Neck:   36 cm in circumference, thyroid soft, no   carotid bruit   Lungs:     Clear    Heart:    Regular rhythm and normal rate   Chest Wall:    No abnormalities observed   Abdomen:     Normal bowel sounds, soft                 Extremities:   Moves all extremities well, no edema               Pulses:   Pulses palpable and equal bilaterally   Skin:   Pump site clean   Neurologic:  DTR 1+ in ankles, vibratory sense 25 % decreased in toes, able to feel the 10g monofilament ( same as 1y ago )            Results Review:    I have reviewed the patient's new clinical results, labs & imaging.    Medication Review:   Prior to Admission medications     Medication Sig Start Date End Date Taking? Authorizing Provider   atorvastatin (LIPITOR) 10 MG tablet Take 1 tablet by mouth Daily. 5/11/20  Yes Neyda Green MD   CONTOUR NEXT TEST test strip Use to check blood sugar five times daily 2/20/20  Yes Neyda Green MD   HUMALOG 100 UNIT/ML injection Inject a maximum daily dose of 60 units subcutaneously via insulin pump dx:e10.65 9/16/19  Yes Neyda Green MD   norgestimate-ethinyl estradiol (TRI-SPRINTEC) 0.18/0.215/0.25 MG-35 MCG per tablet Take 1 tablet by mouth Daily. 8/28/19  Yes Carol Schreiber MD   ondansetron ODT (ZOFRAN-ODT) 4 MG disintegrating tablet Take 1 tablet by mouth Every 8 (Eight) Hours As Needed for Nausea. 2/14/20  Yes Glynn Rinaldi MD   valACYclovir (VALTREX) 500 MG tablet Take 500 mg by mouth Daily. 3/30/20  Yes ProviderLexie MD   vitamin D (ERGOCALCIFEROL) 1.25 MG (24110 UT) capsule capsule Take 1 capsule by mouth 1 (One) Time Per Week. 5/11/20   Neyda Green MD       Lab Results (most recent)     Procedure Component Value Units Date/Time    POC Glucose Fingerstick [[id number]]  (Abnormal) Collected:  05/11/20 1321    Specimen:  Blood Updated:  05/11/20 1321     Glucose 170 mg/dL             Lab Results   Component Value Date    HGBA1C 9.1 (H) 05/05/2020    HGBA1C 7.7 (H) 04/09/2019        Lab Results   Component Value Date    RMTT37MW 21.8 (L) 05/05/2020       Assessment/Plan     Luis was seen today for diabetes.    Diagnoses and all orders for this visit:    Type 1 diabetes mellitus without complication (CMS/[facility])  -     POC Glucose Fingerstick  -     Hemoglobin A1c; Future  -     Microalbumin / Creatinine Urine Ratio - Urine, Clean Catch; Future  -     Ambulatory Referral to Diabetic Education    Mixed hyperlipidemia  -     atorvastatin (LIPITOR) 10 MG tablet; Take 1 tablet by mouth Daily.  -     Comprehensive Metabolic Panel; Future  -     Lipid Panel; Future  -     TSH; Future    Vitamin D  deficiency  -     vitamin D (ERGOCALCIFEROL) 1.25 MG (68496 UT) capsule capsule; Take 1 capsule by mouth 1 (One) Time Per Week.  -     Vitamin D 25 Hydroxy; Future    Insulin pump status    Wearing Minimed 670G pump since 8/2017. Not using sensors.    See eye doctor in July as scheduled.  Increase exercise as planned.  Take vit D 50,000 one weekly.  Schedule MNT. Bring 2 d food records to appt.  Change ICR to 1:6 all day.  Call if blood sugars are running under 100 or over 200.        Neyda Green MD  05/14/20  14:12

## 2020-06-02 ENCOUNTER — OFFICE VISIT (OUTPATIENT)
Dept: ENDOCRINOLOGY | Facility: CLINIC | Age: 45
End: 2020-06-02

## 2020-06-02 DIAGNOSIS — E10.65 TYPE 1 DIABETES MELLITUS WITH HYPERGLYCEMIA (HCC): ICD-10-CM

## 2020-06-02 PROCEDURE — 97802 MEDICAL NUTRITION INDIV IN: CPT | Performed by: DIETITIAN, REGISTERED

## 2020-06-02 RX ORDER — PROCHLORPERAZINE 25 MG/1
1 SUPPOSITORY RECTAL CONTINUOUS
Qty: 1 EACH | Refills: 3 | Status: SHIPPED | OUTPATIENT
Start: 2020-06-02 | End: 2021-05-04

## 2020-06-02 RX ORDER — PROCHLORPERAZINE 25 MG/1
SUPPOSITORY RECTAL
COMMUNITY
End: 2020-06-02 | Stop reason: SDUPTHER

## 2020-06-02 RX ORDER — PROCHLORPERAZINE 25 MG/1
SUPPOSITORY RECTAL
Qty: 3 EACH | Refills: 3 | Status: SHIPPED | OUTPATIENT
Start: 2020-06-02 | End: 2021-05-04

## 2020-06-19 ENCOUNTER — TELEPHONE (OUTPATIENT)
Dept: ENDOCRINOLOGY | Facility: CLINIC | Age: 45
End: 2020-06-19

## 2020-06-19 NOTE — TELEPHONE ENCOUNTER
The labs I ordered @ the May visit were to be done in 6 months.  I did not order these.  The A1C is exactly the same & way too soon to make a difference ( & probably won't be covered by her insurance ).  She just had the MNT appt. Give it some time.  Vit D is in the normal range now ( again, prob won't be covered since the last one was only 1 month ago ).

## 2020-06-19 NOTE — TELEPHONE ENCOUNTER
Patient is requesting lab results. Please advise? She is a little concerned on her labs in comparison to the last ones done about a month ago.

## 2020-06-22 ENCOUNTER — TELEPHONE (OUTPATIENT)
Dept: SPORTS MEDICINE | Facility: CLINIC | Age: 45
End: 2020-06-22

## 2020-06-22 NOTE — TELEPHONE ENCOUNTER
Called & talked to pt.  I did not order the c-reactive protein & she should discuss with who ordered it & what they were expecting to achieve with it.  She said she went to [facility] & they ordered it.  We discussed it is one measure of inflammation, not reliable by itself. I never order it. Can't really give her any feed back.  She thanked me for the call.

## 2020-06-22 NOTE — TELEPHONE ENCOUNTER
Patient called in reporting mid to lower back pain on the right side, pain with urination, no burning sensations, does have a slight smell and cloudy appearance. Wants to know if you can call something in for her, or if not does she need to make a lab appointment for urine or an appointment to see you in office?.    Thanks  Abbie

## 2020-06-22 NOTE — TELEPHONE ENCOUNTER
Pt notified. She was more concerned about the C-reactive protein results being high. Please advise.

## 2020-06-23 RX ORDER — NITROFURANTOIN 25; 75 MG/1; MG/1
100 CAPSULE ORAL 2 TIMES DAILY
Qty: 10 CAPSULE | Refills: 0 | Status: SHIPPED | OUTPATIENT
Start: 2020-06-23 | End: 2020-06-28

## 2020-06-23 NOTE — TELEPHONE ENCOUNTER
Attempted to contact patient, no answer when called, left a detailed voicemail for the patient with information to call back if sxs do not get better.     Thanks  Abbie

## 2020-08-24 RX ORDER — NORGESTIMATE AND ETHINYL ESTRADIOL 7DAYSX3 28
KIT ORAL
Qty: 84 TABLET | Refills: 0 | Status: SHIPPED | OUTPATIENT
Start: 2020-08-24 | End: 2020-09-30 | Stop reason: SDUPTHER

## 2020-09-30 ENCOUNTER — OFFICE VISIT (OUTPATIENT)
Dept: OBSTETRICS AND GYNECOLOGY | Facility: CLINIC | Age: 45
End: 2020-09-30

## 2020-09-30 VITALS
SYSTOLIC BLOOD PRESSURE: 116 MMHG | BODY MASS INDEX: 27.26 KG/M2 | DIASTOLIC BLOOD PRESSURE: 82 MMHG | HEIGHT: 66 IN | WEIGHT: 169.6 LBS

## 2020-09-30 DIAGNOSIS — B00.9 HSV-2 INFECTION: ICD-10-CM

## 2020-09-30 DIAGNOSIS — Z01.411 ENCOUNTER FOR GYNECOLOGICAL EXAMINATION WITH ABNORMAL FINDING: ICD-10-CM

## 2020-09-30 DIAGNOSIS — Z30.41 ORAL CONTRACEPTIVE USE: ICD-10-CM

## 2020-09-30 DIAGNOSIS — Z13.9 SCREENING FOR UNSPECIFIED CONDITION: Primary | ICD-10-CM

## 2020-09-30 LAB
B-HCG UR QL: NEGATIVE
BILIRUB BLD-MCNC: NEGATIVE MG/DL
CLARITY, POC: CLEAR
COLOR UR: YELLOW
GLUCOSE UR STRIP-MCNC: NEGATIVE MG/DL
INTERNAL NEGATIVE CONTROL: NEGATIVE
INTERNAL POSITIVE CONTROL: POSITIVE
KETONES UR QL: ABNORMAL
LEUKOCYTE EST, POC: NEGATIVE
Lab: NORMAL
NITRITE UR-MCNC: NEGATIVE MG/ML
PH UR: 6 [PH] (ref 5–8)
PROT UR STRIP-MCNC: NEGATIVE MG/DL
RBC # UR STRIP: NEGATIVE /UL
SP GR UR: 1.01 (ref 1–1.03)
UROBILINOGEN UR QL: NORMAL

## 2020-09-30 PROCEDURE — 99396 PREV VISIT EST AGE 40-64: CPT | Performed by: OBSTETRICS & GYNECOLOGY

## 2020-09-30 PROCEDURE — 81025 URINE PREGNANCY TEST: CPT | Performed by: OBSTETRICS & GYNECOLOGY

## 2020-09-30 PROCEDURE — 81002 URINALYSIS NONAUTO W/O SCOPE: CPT | Performed by: OBSTETRICS & GYNECOLOGY

## 2020-09-30 RX ORDER — ROSUVASTATIN CALCIUM 20 MG/1
20 TABLET, COATED ORAL DAILY
COMMUNITY
Start: 2020-09-14 | End: 2022-09-06 | Stop reason: SDUPTHER

## 2020-09-30 RX ORDER — PHENTERMINE HYDROCHLORIDE 30 MG/1
30 CAPSULE ORAL DAILY
COMMUNITY
Start: 2020-09-17 | End: 2021-05-04

## 2020-09-30 RX ORDER — FLUTICASONE PROPIONATE 0.05 %
CREAM (GRAM) TOPICAL
COMMUNITY
Start: 2020-07-15

## 2020-09-30 RX ORDER — NORGESTIMATE AND ETHINYL ESTRADIOL 7DAYSX3 28
1 KIT ORAL DAILY
Qty: 84 TABLET | Refills: 1 | Status: SHIPPED | OUTPATIENT
Start: 2020-09-30 | End: 2021-05-04

## 2020-09-30 RX ORDER — CLOBETASOL PROPIONATE 0.46 MG/ML
SOLUTION TOPICAL
COMMUNITY
Start: 2020-07-14 | End: 2022-11-09

## 2020-09-30 NOTE — PROGRESS NOTES
GYN Annual Exam     CC- Here for annual exam.     Luis Wilcox is a 45 y.o. female established patient who presents for annual well woman exam. She is on OCPS and her cycles are every 28-30 days and last 3 days. She is not taking Valtrex for suppression. We discussed that she does need to get a MMG since she is on a form of HRT and she prefers to have this done at [facility] in [place].     OB History        2    Para   2    Term   2            AB        Living   2       SAB        TAB        Ectopic        Molar        Multiple        Live Births              Obstetric Comments   2              Menarche: 13  Current contraception: OCP (estrogen/progesterone)  History of abnormal Pap smear: yes -  1 abnormal with normal followup  History of abnormal mammogram: yes - s/p B9 breast bx  Family history of uterine, colon or ovarian cancer: no  Family history of breast cancer: no  H/o STDs: HSV 2  Gardasil: none  Last pap: 2019- normal pap/ neg HPV  Gardasil:missed  CHUNG: none    Health Maintenance   Topic Date Due   • URINE MICROALBUMIN  1977   • Hepatitis B (1 of 3 - Risk 3-dose series) 1996   • HEPATITIS C SCREENING  2017   • DIABETIC FOOT EXAM  2017   • LIPID PANEL  2017   • ANNUAL PHYSICAL  2019   • DIABETIC EYE EXAM  2020   • INFLUENZA VACCINE  2020   • Annual Gynecologic Pelvic and Breast Exam  2020   • HEMOGLOBIN A1C  2020   • PAP SMEAR  2022   • TDAP/TD VACCINES (2 - Td) 2028   • Pneumococcal Vaccine 0-64  Completed       Past Medical History:   Diagnosis Date   • Abnormal Pap smear of cervix     normal pap + HPV ,  both were normal   • Diabetes mellitus (CMS/HCC)    • Diabetes mellitus type I (CMS/HCC)    • Herpes     HSV 2   • HPV (human papilloma virus) infection    • Hyperlipidemia        Past Surgical History:   Procedure Laterality Date   • BREAST BIOPSY     • EAR TUBES     • WISDOM TOOTH  EXTRACTION           Current Outpatient Medications:   •  atorvastatin (LIPITOR) 10 MG tablet, Take 1 tablet by mouth Daily., Disp: 90 tablet, Rfl: 3  •  clobetasol (TEMOVATE) 0.05 % external solution, APPLY TO AFFECTED AREA EVERY DAY AS NEEDED, Disp: , Rfl:   •  Continuous Blood Gluc Sensor (DEXCOM G6 SENSOR), Pt to replace sensor every 10 days, Disp: 3 each, Rfl: 3  •  Continuous Blood Gluc Transmit (DEXCOM G6 TRANSMITTER) misc, 1 each Continuous. Pt to use to monitor her blood sugars, Disp: 1 each, Rfl: 3  •  CONTOUR NEXT TEST test strip, Use to check blood sugar five times daily, Disp: 450 each, Rfl: 3  •  fluticasone (CUTIVATE) 0.05 % cream, APPLY SPARINGLY TWICE A DAY AS NEEDED TO THE AFFECTED AREA ON CHEST AND UNDER BREASTS, Disp: , Rfl:   •  HUMALOG 100 UNIT/ML injection, Inject a maximum daily dose of 60 units subcutaneously via insulin pump dx:e10.65, Disp: 60 mL, Rfl: 3  •  mupirocin (BACTROBAN) 2 % ointment, APPLY TWICE DAILY X 10DAYS TO AXILLAE,UMBILICUS,GROIN AND PERIANAL AREAS, Disp: , Rfl:   •  norgestimate-ethinyl estradiol (Tri-Sprintec) 0.18/0.215/0.25 MG-35 MCG per tablet, Take 1 tablet by mouth Daily., Disp: 84 tablet, Rfl: 1  •  ondansetron ODT (ZOFRAN-ODT) 4 MG disintegrating tablet, Take 1 tablet by mouth Every 8 (Eight) Hours As Needed for Nausea., Disp: 5 tablet, Rfl: 0  •  phentermine 30 MG capsule, Take 30 mg by mouth Daily., Disp: , Rfl:   •  rosuvastatin (CRESTOR) 20 MG tablet, Take 20 mg by mouth Daily., Disp: , Rfl:   •  valACYclovir (VALTREX) 500 MG tablet, Take 500 mg by mouth Daily., Disp: , Rfl:   •  vitamin D (ERGOCALCIFEROL) 1.25 MG (48571 UT) capsule capsule, Take 1 capsule by mouth 1 (One) Time Per Week., Disp: 12 capsule, Rfl: 3    No Known Allergies    Social History     Tobacco Use   • Smoking status: Never Smoker   • Smokeless tobacco: Never Used   Substance Use Topics   • Alcohol use: Yes     Comment: occasionally   • Drug use: No       Family History   Problem Relation  "Age of Onset   • Hyperlipidemia Father    • Diabetes Paternal Grandfather    • Stroke Paternal Grandfather    • No Known Problems Mother    • No Known Problems Brother    • No Known Problems Daughter    • No Known Problems Daughter    • Breast cancer Neg Hx    • Ovarian cancer Neg Hx    • Colon cancer Neg Hx    • Deep vein thrombosis Neg Hx    • Pulmonary embolism Neg Hx        Review of Systems   Constitutional: Positive for activity change and unexpected weight change (loss). Negative for appetite change, fatigue and fever.   Eyes: Negative for photophobia and visual disturbance.   Respiratory: Negative for cough and shortness of breath.    Cardiovascular: Negative for chest pain and palpitations.   Gastrointestinal: Negative for abdominal distention (bloating.), abdominal pain, constipation, diarrhea and nausea.   Endocrine: Negative for cold intolerance and heat intolerance.   Genitourinary: Negative for dyspareunia, dysuria, menstrual problem, pelvic pain, vaginal bleeding and vaginal discharge.   Musculoskeletal: Negative for back pain.   Skin: Negative for color change and rash.   Neurological: Negative for headaches.   Hematological: Negative for adenopathy. Does not bruise/bleed easily.   Psychiatric/Behavioral: Negative for dysphoric mood. The patient is not nervous/anxious.        /82   Ht 167.6 cm (66\")   Wt 76.9 kg (169 lb 9.6 oz)   LMP 09/20/2020 (Exact Date)   BMI 27.37 kg/m²     Physical Exam   Constitutional: She is oriented to person, place, and time. She appears well-developed.   HENT:   Head: Normocephalic and atraumatic.   Eyes: Conjunctivae are normal. No scleral icterus.   Neck: No thyromegaly present.   Cardiovascular: Normal rate, regular rhythm and normal heart sounds.   Pulmonary/Chest: Effort normal and breath sounds normal. Right breast exhibits mass. Right breast exhibits no inverted nipple, no nipple discharge, no skin change and no tenderness. Left breast exhibits no " inverted nipple, no mass, no nipple discharge, no skin change and no tenderness.       Abdominal: Soft. Normal appearance and bowel sounds are normal. She exhibits no distension and no mass. There is no abdominal tenderness. There is no rebound and no guarding. No hernia.   Genitourinary:    Vulva and rectum normal.      Pelvic exam was performed with patient supine.   There is no rash, tenderness or lesion on the right labia. There is no rash, tenderness or lesion on the left labia. Uterus is not deviated, not enlarged, not fixed and not tender. Cervix exhibits no motion tenderness, no discharge and no friability. Right adnexum displays no mass, no tenderness and no fullness. Left adnexum displays no mass, no tenderness and no fullness.    No vaginal discharge, erythema, tenderness or bleeding.   No erythema, tenderness or bleeding in the vagina.    No foreign body in the vagina.      No signs of injury in the vagina.     Neurological: She is alert and oriented to person, place, and time.   Skin: Skin is warm and dry.   Psychiatric: Her behavior is normal. Judgment and thought content normal.   Nursing note and vitals reviewed.         Assessment/Plan    1) GYN HM: normal pap/HPV 8/2019 SBE demonstrated and encouraged.  2) STD screening: declines Condoms encouraged.  3) Contraception: OCP (estrogen/progesterone) Discussed with patient correct usage of oral contraceptive pills/patches/rings and what to do for a missed dose.  Patient reminded that condoms are the only form of contraceptive that can also prevent STDs and so use is encouraged with every act of coitus.  We reviewed ACHES warning signs (abdominal pain, chest pain, headache, eye vision changes or severe leg pain and or swelling).  Patient is encouraged to call for any questions or concerns.    4) Family Planning: no plans, encourage folic acid daily  5) Diet and Exercise discussed  6) Smoking Status: No  7) .  MMG- past due, enc pt to schedule ASAP. I  will not continue to refill OCPS without breast imaging. Pt prefers [facility]  8) Breast mass- pt had normal bx of area in 2017, declines removal. Mass is unchanged but is large and may obscure other findings.  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   10) Parts of this document have been copied or forwarded from her previous visits and have been reviewed, updated and edited as indicated.   11) I saw the patient with a face mask, gloves and eye protection  The patient herself was masked.  Social distancing was observed as appropriate.  12)Follow up prn or 1 year       Luis was seen today for gynecologic exam.    Diagnoses and all orders for this visit:    Screening for unspecified condition  -     POC Pregnancy, Urine  -     POC Urinalysis Dipstick  -     Mammo Screening Bilateral With CAD; Future    Oral contraceptive use    HSV-2 infection    Other orders  -     norgestimate-ethinyl estradiol (Tri-Sprintec) 0.18/0.215/0.25 MG-35 MCG per tablet; Take 1 tablet by mouth Daily.          Carol Schreiber MD  9/30/2020    19:00 EDT

## 2020-11-01 DIAGNOSIS — E10.65 TYPE 1 DIABETES MELLITUS WITH HYPERGLYCEMIA (HCC): Primary | ICD-10-CM

## 2020-11-04 LAB
25(OH)D3+25(OH)D2 SERPL-MCNC: 36.7 NG/ML (ref 30–100)
ALBUMIN SERPL-MCNC: 3.7 G/DL (ref 3.8–4.8)
ALBUMIN/CREAT UR: 4 MG/G CREAT (ref 0–29)
ALBUMIN/GLOB SERPL: 1.2 {RATIO} (ref 1.2–2.2)
ALP SERPL-CCNC: 90 IU/L (ref 39–117)
ALT SERPL-CCNC: 13 IU/L (ref 0–32)
AMBIG ABBREV CMP14 DEFAULT: NORMAL
AMBIG ABBREV LP DEFAULT: NORMAL
AST SERPL-CCNC: 15 IU/L (ref 0–40)
BILIRUB SERPL-MCNC: 0.3 MG/DL (ref 0–1.2)
BUN SERPL-MCNC: 18 MG/DL (ref 6–24)
BUN/CREAT SERPL: 20 (ref 9–23)
CALCIUM SERPL-MCNC: 9.1 MG/DL (ref 8.7–10.2)
CHLORIDE SERPL-SCNC: 99 MMOL/L (ref 96–106)
CHOLEST SERPL-MCNC: 142 MG/DL (ref 100–199)
CO2 SERPL-SCNC: 26 MMOL/L (ref 20–29)
CREAT SERPL-MCNC: 0.92 MG/DL (ref 0.57–1)
CREAT UR-MCNC: 208.6 MG/DL
GLOBULIN SER CALC-MCNC: 3.1 G/DL (ref 1.5–4.5)
GLUCOSE SERPL-MCNC: 293 MG/DL (ref 65–99)
HBA1C MFR BLD: 8.2 % (ref 4.8–5.6)
HDLC SERPL-MCNC: 41 MG/DL
LDLC SERPL CALC-MCNC: 77 MG/DL (ref 0–99)
MICROALBUMIN UR-MCNC: 8.6 UG/ML
POTASSIUM SERPL-SCNC: 5 MMOL/L (ref 3.5–5.2)
PROT SERPL-MCNC: 6.8 G/DL (ref 6–8.5)
SODIUM SERPL-SCNC: 135 MMOL/L (ref 134–144)
TRIGL SERPL-MCNC: 134 MG/DL (ref 0–149)
TSH SERPL DL<=0.005 MIU/L-ACNC: 3.53 UIU/ML (ref 0.45–4.5)
VLDLC SERPL CALC-MCNC: 24 MG/DL (ref 5–40)

## 2020-11-07 ENCOUNTER — RESULTS ENCOUNTER (OUTPATIENT)
Dept: ENDOCRINOLOGY | Facility: CLINIC | Age: 45
End: 2020-11-07

## 2020-11-07 DIAGNOSIS — E78.2 MIXED HYPERLIPIDEMIA: ICD-10-CM

## 2020-11-07 DIAGNOSIS — E55.9 VITAMIN D DEFICIENCY: ICD-10-CM

## 2020-11-07 DIAGNOSIS — E10.9 TYPE 1 DIABETES MELLITUS WITHOUT COMPLICATION (HCC): ICD-10-CM

## 2020-11-10 ENCOUNTER — TELEPHONE (OUTPATIENT)
Dept: ENDOCRINOLOGY | Facility: CLINIC | Age: 45
End: 2020-11-10

## 2020-11-10 ENCOUNTER — OFFICE VISIT (OUTPATIENT)
Dept: ENDOCRINOLOGY | Facility: CLINIC | Age: 45
End: 2020-11-10

## 2020-11-10 VITALS
BODY MASS INDEX: 25.74 KG/M2 | HEIGHT: 67 IN | WEIGHT: 164 LBS | HEART RATE: 99 BPM | OXYGEN SATURATION: 99 % | DIASTOLIC BLOOD PRESSURE: 76 MMHG | TEMPERATURE: 97 F | SYSTOLIC BLOOD PRESSURE: 122 MMHG

## 2020-11-10 DIAGNOSIS — Z96.41 INSULIN PUMP STATUS: ICD-10-CM

## 2020-11-10 DIAGNOSIS — E55.9 VITAMIN D DEFICIENCY: ICD-10-CM

## 2020-11-10 DIAGNOSIS — E78.2 MIXED HYPERLIPIDEMIA: ICD-10-CM

## 2020-11-10 DIAGNOSIS — E10.65 TYPE 1 DIABETES MELLITUS WITH HYPERGLYCEMIA (HCC): Primary | ICD-10-CM

## 2020-11-10 PROCEDURE — 82962 GLUCOSE BLOOD TEST: CPT | Performed by: INTERNAL MEDICINE

## 2020-11-10 PROCEDURE — 99214 OFFICE O/P EST MOD 30 MIN: CPT | Performed by: INTERNAL MEDICINE

## 2020-11-10 NOTE — PATIENT INSTRUCTIONS
Keep up the good work!  Change ICR to 1:5 12a-5p.  Continue exercise.  Continue meds.  Restart vit D supplements during witer.  Call if blood sugars are running under 100 or over 200.  F/u in 6 months, with labs prior.

## 2020-11-11 LAB — GLUCOSE BLDC GLUCOMTR-MCNC: 76 MG/DL (ref 70–105)

## 2020-11-15 NOTE — PROGRESS NOTES
[facility]        Patient Care Team:  Jarad Valente Jr., DO as PCP - General (Sports Medicine)  Carol Schreiber MD as Consulting Physician (Obstetrics and Gynecology)  Neyda Green MD as Consulting Physician (Endocrinology)    Chief Complaint:    Chief Complaint   Patient presents with   • Diabetes     type 1, bs 76, Ate@ 12:45pm 30 min ago, basal rates 12a 0.95, 6a 0.95, 2p 0.90, 8p 0.90         Subjective   Here for diabetes f/u  Blood sugars: in the high 100's, higher in the evening  Insulin delivery per pump: Basal 65% / bolus 35%  Exercise program: treadmill 3 d / week  Not taking vit D  Attended MNT in June Interval History:     Patient Complaints: none  Patient Denies:  hypoglycemia  History taken from: patient    Review of Systems:   Review of Systems   Eyes: Negative for blurred vision.   Gastrointestinal: Negative for nausea.   Endocrine: Negative for polyuria.   Neurological: Negative for headache.     Lost  25 lb since last visit, intentionally    Objective     Vital Signs      Vitals:    11/10/20 1312   BP: 122/76   Pulse: 99   Temp: 97 °F (36.1 °C)   SpO2: 99%         Physical Exam:     General Appearance:    Alert, cooperative, in no acute distress   Head:    Normocephalic, without obvious abnormality, atraumatic   Eyes:            Lids and lashes normal, conjunctivae and sclerae normal, no   icterus, no pallor, corneas clear, PERRLA   Throat:   No oral lesions,  oral mucosa moist   Neck:   34.5 cm in circumference, thyromegaly, no   carotid bruit   Lungs:     Clear     Heart:    Regular rhythm and normal rate   Chest Wall:    No abnormalities observed   Abdomen:     Normal bowel sounds, soft                 Extremities:   Moves all extremities well, no edema               Pulses:   Pulses palpable and equal bilaterally   Skin:   Pump site clean   Neurologic:  DTR 1+, able to feel the 10g monofilament          Results Review:    I have reviewed the  patient's new clinical results, labs & imaging.    Medication Review:   Prior to Admission medications    Medication Sig Start Date End Date Taking? Authorizing Provider   CONTOUR NEXT TEST test strip Use to check blood sugar five times daily 2/20/20  Yes Neyda Green MD   HumaLOG 100 UNIT/ML injection INJECT A MAXIMUM DAILY     DOSEOF 60 UNITS            SUBCUTANEOUSLY VIA INSULIN PUMP 11/2/20  Yes Neyda Green MD   norgestimate-ethinyl estradiol (Tri-Sprintec) 0.18/0.215/0.25 MG-35 MCG per tablet Take 1 tablet by mouth Daily. 9/30/20  Yes Carol Schreiber MD   phentermine 30 MG capsule Take 30 mg by mouth Daily. 9/17/20  Yes Lexie Pham MD   rosuvastatin (CRESTOR) 20 MG tablet Take 20 mg by mouth Daily. 9/14/20  Yes Lexie Pham MD   valACYclovir (VALTREX) 500 MG tablet Take 500 mg by mouth Daily. 3/30/20  Yes Lexie Pham MD   clobetasol (TEMOVATE) 0.05 % external solution APPLY TO AFFECTED AREA EVERY DAY AS NEEDED 7/14/20   Lexie Pham MD   Continuous Blood Gluc Sensor (DEXCOM G6 SENSOR) Pt to replace sensor every 10 days 6/2/20   Neyda Green MD   Continuous Blood Gluc Transmit (DEXCOM G6 TRANSMITTER) misc 1 each Continuous. Pt to use to monitor her blood sugars 6/2/20   Neyda Green MD   fluticasone (CUTIVATE) 0.05 % cream APPLY SPARINGLY TWICE A DAY AS NEEDED TO THE AFFECTED AREA ON CHEST AND UNDER BREASTS 7/15/20   Lexie Pham MD   mupirocin (BACTROBAN) 2 % ointment APPLY TWICE DAILY X 10DAYS TO AXILLAE,UMBILICUS,GROIN AND PERIANAL AREAS 7/21/20   Lexie Pham MD   vitamin D (ERGOCALCIFEROL) 1.25 MG (76287 UT) capsule capsule Take 1 capsule by mouth 1 (One) Time Per Week. 5/11/20   Neyda Green MD       Lab Results (most recent)     Procedure Component Value Units Date/Time    POC Glucose [[id number]]  (Normal) Collected: 11/10/20 1310    Specimen: Blood Updated: 11/11/20 1541     Glucose 76 mg/dL      Comment:  Serial Number: [id number]:  [id number]           Lab Results   Component Value Date    HGBA1C 8.2 (H) 11/03/2020    HGBA1C 9.1 (H) 05/05/2020    HGBA1C 7.7 (H) 04/09/2019      Lab Results   Component Value Date    BUN 18 11/03/2020    CREATININE 0.92 11/03/2020    EGFRIFNONA 77 11/03/2020    EGFRIFAFRI 88 11/03/2020    BCR 20 11/03/2020    K 5.0 11/03/2020    CO2 26 11/03/2020    CALCIUM 9.1 11/03/2020    PROTENTOTREF 6.8 11/03/2020    ALBUMIN 3.7 (L) 11/03/2020    LABIL2 1.2 11/03/2020    AST 15 11/03/2020    ALT 13 11/03/2020    LDL 77 11/03/2020    HDL 41 11/03/2020    TRIG 134 11/03/2020     Lab Results   Component Value Date    TSH 3.530 11/03/2020    YBSC95JP 36.7 11/03/2020     Microalb/cr ratio 4    Assessment/Plan     Diagnoses and all orders for this visit:    1. Type 1 diabetes mellitus with hyperglycemia (CMS/[facility]) (Primary) - improved  -     Hemoglobin A1c; Future    2. Vitamin D deficiency - improved    3. Mixed hyperlipidemia - stable    4. Insulin pump status    Other orders  -     POC Glucose    Wearing Minimed 670G pump since 8/2017. Not using sensors.    Change ICR to 1:5 12a-5p.  Continue exercise.  Continue chol meds.  Restart vit D supplements during winter.  Call if blood sugars are running under 100 or over 200.          Neyda Green MD  11/15/20  15:08 EST

## 2021-04-02 ENCOUNTER — BULK ORDERING (OUTPATIENT)
Dept: CASE MANAGEMENT | Facility: OTHER | Age: 46
End: 2021-04-02

## 2021-04-02 DIAGNOSIS — Z23 IMMUNIZATION DUE: ICD-10-CM

## 2021-04-28 LAB — HBA1C MFR BLD: 9.2 % (ref 4.8–5.6)

## 2021-05-04 ENCOUNTER — TELEPHONE (OUTPATIENT)
Dept: ENDOCRINOLOGY | Facility: CLINIC | Age: 46
End: 2021-05-04

## 2021-05-04 ENCOUNTER — OFFICE VISIT (OUTPATIENT)
Dept: ENDOCRINOLOGY | Facility: CLINIC | Age: 46
End: 2021-05-04

## 2021-05-04 VITALS
BODY MASS INDEX: 27 KG/M2 | SYSTOLIC BLOOD PRESSURE: 106 MMHG | DIASTOLIC BLOOD PRESSURE: 60 MMHG | HEIGHT: 66 IN | TEMPERATURE: 97.5 F | HEART RATE: 96 BPM | OXYGEN SATURATION: 99 % | WEIGHT: 168 LBS

## 2021-05-04 DIAGNOSIS — E10.65 TYPE 1 DIABETES MELLITUS WITH HYPERGLYCEMIA (HCC): Primary | ICD-10-CM

## 2021-05-04 DIAGNOSIS — E78.2 MIXED HYPERLIPIDEMIA: ICD-10-CM

## 2021-05-04 DIAGNOSIS — E55.9 VITAMIN D DEFICIENCY: ICD-10-CM

## 2021-05-04 DIAGNOSIS — Z96.41 INSULIN PUMP STATUS: ICD-10-CM

## 2021-05-04 LAB — GLUCOSE BLDC GLUCOMTR-MCNC: 135 MG/DL (ref 70–105)

## 2021-05-04 PROCEDURE — 82962 GLUCOSE BLOOD TEST: CPT | Performed by: INTERNAL MEDICINE

## 2021-05-04 PROCEDURE — 99214 OFFICE O/P EST MOD 30 MIN: CPT | Performed by: INTERNAL MEDICINE

## 2021-05-04 RX ORDER — NORGESTIMATE AND ETHINYL ESTRADIOL 7DAYSX3 28
KIT ORAL
Qty: 84 TABLET | Refills: 1 | Status: SHIPPED | OUTPATIENT
Start: 2021-05-04 | End: 2021-10-28

## 2021-05-12 NOTE — PROGRESS NOTES
[facility]        Patient Care Team:  Jarad Valente Jr., DO as PCP - General (Sports Medicine)  Carol Schreiber MD as Consulting Physician (Obstetrics and Gynecology)  Neyda Green MD as Consulting Physician (Endocrinology)    Chief Complaint:    Chief Complaint   Patient presents with   • Diabetes     Type 1,  20min PP, Basal 12am .9, 6am .95, 2pm .90, 8pm .90         Subjective   Here for diabetes f/u  Blood sugars: not using sensors, not entering bl sugar to pump  Insulin delivery per pump: Basal 50% / bolus 50%  Exercise program: not much  Taking vit D    Interval History:     Patient Complaints: stressed  Patient Denies:  hypoglycemia  History taken from: patient    Review of Systems:   Review of Systems   Eyes: Negative for blurred vision.   Gastrointestinal: Negative for nausea.   Endocrine: Negative for polyuria.   Neurological: Negative for headache.   Psychiatric/Behavioral: Positive for stress.     Gained 4 lb since last visit    Objective     Vital Signs      Vitals:    05/04/21 1312   BP: 106/60   Pulse: 96   Temp: 97.5 °F (36.4 °C)   SpO2: 99%         Physical Exam:     General Appearance:    Alert, cooperative, in no acute distress   Head:    Normocephalic, without obvious abnormality, atraumatic   Eyes:            Lids and lashes normal, conjunctivae and sclerae normal, no   icterus, no pallor, corneas clear, PERRLA   Throat:   No oral lesions,  oral mucosa moist   Neck:   35 cm in circumference, thyroid soft, no   carotid bruit   Lungs:     Clear    Heart:    Regular rhythm and normal rate   Chest Wall:    No abnormalities observed   Abdomen:     Normal bowel sounds, soft                 Extremities:   Moves all extremities well, no edema               Pulses:   Pulses palpable and equal bilaterally   Skin:   Pump site clean   Neurologic:  DTR 1+ in ankles, vibratory sense 25% decreased in toes, able to feel the 10g monofilament ( same as 1y ago  )            Results Review:    I have reviewed the patient's new clinical results, labs & imaging.    Medication Review:   Prior to Admission medications    Medication Sig Start Date End Date Taking? Authorizing Provider   clobetasol (TEMOVATE) 0.05 % external solution APPLY TO AFFECTED AREA EVERY DAY AS NEEDED 7/14/20  Yes Lexie Pham MD   CONTOUR NEXT TEST test strip Use to check blood sugar five times daily 2/20/20  Yes Neyda Green MD   fluticasone (CUTIVATE) 0.05 % cream APPLY SPARINGLY TWICE A DAY AS NEEDED TO THE AFFECTED AREA ON CHEST AND UNDER BREASTS 7/15/20  Yes Lexie Pham MD   HumaLOG 100 UNIT/ML injection INJECT A MAXIMUM DAILY     DOSEOF 60 UNITS            SUBCUTANEOUSLY VIA INSULIN PUMP 11/2/20  Yes Neyda Green MD   mupirocin (BACTROBAN) 2 % ointment APPLY TWICE DAILY X 10DAYS TO AXILLAE,UMBILICUS,GROIN AND PERIANAL AREAS 7/21/20  Yes Lexie Pham MD   rosuvastatin (CRESTOR) 20 MG tablet Take 20 mg by mouth Daily. 9/14/20  Yes Lexie Pham MD   valACYclovir (VALTREX) 500 MG tablet Take 500 mg by mouth Daily. 3/30/20  Yes Lexie Pham MD   vitamin D (ERGOCALCIFEROL) 1.25 MG (25548 UT) capsule capsule Take 1 capsule by mouth 1 (One) Time Per Week. 5/11/20  Yes Neyda Green MD   Tri-Estarylla 0.18/0.215/0.25 MG-35 MCG per tablet TAKE 1 TABLET DAILY 5/4/21   Carol Schreiber MD       Lab Results (most recent)     Procedure Component Value Units Date/Time    POC Glucose [[id number]]  (Abnormal) Collected: 05/04/21 1316    Specimen: Blood Updated: 05/04/21 1317     Glucose 135 mg/dL      Comment: Serial Number: [id number]:  [id number]           Lab Results   Component Value Date    HGBA1C 9.2 (H) 04/27/2021    HGBA1C 8.2 (H) 11/03/2020    HGBA1C 9.1 (H) 05/05/2020      Lab Results   Component Value Date    BUN 18 11/03/2020    CREATININE 0.92 11/03/2020    EGFRIFNONA 77 11/03/2020    EGFRIFAFRI 88 11/03/2020    BCR 20  11/03/2020    K 5.0 11/03/2020    CO2 26 11/03/2020    CALCIUM 9.1 11/03/2020    PROTENTOTREF 6.8 11/03/2020    ALBUMIN 3.7 (L) 11/03/2020    LABIL2 1.2 11/03/2020    AST 15 11/03/2020    ALT 13 11/03/2020    LDL 77 11/03/2020    HDL 41 11/03/2020    TRIG 134 11/03/2020     Lab Results   Component Value Date    TSH 3.530 11/03/2020    TRXE17ZD 36.7 11/03/2020       Assessment/Plan     Diagnoses and all orders for this visit:    1. Type 1 diabetes mellitus with hyperglycemia (CMS/[facility]) (Primary) - worse  -     Hemoglobin A1c; Future  -     Microalbumin / Creatinine Urine Ratio - Urine, Clean Catch; Future    2. Mixed hyperlipidemia - will check status next visit  -     Comprehensive Metabolic Panel; Future  -     Lipid Panel; Future  -     TSH; Future    3. Vitamin D deficiency - will check status next visit  -     Vitamin D 25 Hydroxy; Future    4. Insulin pump status    Other orders  -     POC Glucose    Wearing Minimed 670G pump since 8/2017. Not using sensors.    Increase exercise as planned.  Continue same pump settings, chol meds & vit D supplements.  Call if blood sugars are running under 100 or over 200.        Neyda Green MD  05/11/21  20:08 EDT

## 2021-07-01 DIAGNOSIS — E55.9 VITAMIN D DEFICIENCY: ICD-10-CM

## 2021-07-01 RX ORDER — ERGOCALCIFEROL 1.25 MG/1
50000 CAPSULE ORAL WEEKLY
Qty: 12 CAPSULE | Refills: 3 | Status: SHIPPED | OUTPATIENT
Start: 2021-07-01

## 2021-07-21 ENCOUNTER — TELEPHONE (OUTPATIENT)
Dept: ENDOCRINOLOGY | Facility: CLINIC | Age: 46
End: 2021-07-21

## 2021-08-26 DIAGNOSIS — E10.9 TYPE 1 DIABETES MELLITUS WITHOUT COMPLICATION (HCC): ICD-10-CM

## 2021-08-26 RX ORDER — PERPHENAZINE 16 MG/1
TABLET, FILM COATED ORAL
Qty: 450 EACH | Refills: 3 | Status: SHIPPED | OUTPATIENT
Start: 2021-08-26 | End: 2021-09-01 | Stop reason: CLARIF

## 2021-09-01 DIAGNOSIS — E10.65 TYPE 1 DIABETES MELLITUS WITH HYPERGLYCEMIA (HCC): Primary | ICD-10-CM

## 2021-09-01 RX ORDER — BLOOD-GLUCOSE METER
1 EACH MISCELLANEOUS DAILY
Qty: 1 KIT | Refills: 1 | Status: SHIPPED | OUTPATIENT
Start: 2021-09-01 | End: 2021-11-02

## 2021-09-01 RX ORDER — BLOOD SUGAR DIAGNOSTIC
STRIP MISCELLANEOUS
Qty: 450 EACH | Refills: 3 | Status: SHIPPED | OUTPATIENT
Start: 2021-09-01 | End: 2021-11-02

## 2021-09-01 RX ORDER — LANCETS
EACH MISCELLANEOUS
Qty: 450 EACH | Refills: 3 | Status: SHIPPED | OUTPATIENT
Start: 2021-09-01 | End: 2021-11-02

## 2021-10-28 RX ORDER — NORGESTIMATE AND ETHINYL ESTRADIOL 7DAYSX3 28
KIT ORAL
Qty: 84 TABLET | Refills: 1 | Status: SHIPPED | OUTPATIENT
Start: 2021-10-28 | End: 2021-11-04 | Stop reason: SDUPTHER

## 2021-10-29 LAB
25(OH)D3+25(OH)D2 SERPL-MCNC: 31.2 NG/ML (ref 30–100)
ALBUMIN SERPL-MCNC: 3.9 G/DL (ref 3.8–4.8)
ALBUMIN/CREAT UR: <3 MG/G CREAT (ref 0–29)
ALBUMIN/GLOB SERPL: 1.2 {RATIO} (ref 1.2–2.2)
ALP SERPL-CCNC: 98 IU/L (ref 44–121)
ALT SERPL-CCNC: 12 IU/L (ref 0–32)
AMBIG ABBREV CMP14 DEFAULT: NORMAL
AMBIG ABBREV LP DEFAULT: NORMAL
AST SERPL-CCNC: 17 IU/L (ref 0–40)
BILIRUB SERPL-MCNC: 0.6 MG/DL (ref 0–1.2)
BUN SERPL-MCNC: 15 MG/DL (ref 6–24)
BUN/CREAT SERPL: 17 (ref 9–23)
CALCIUM SERPL-MCNC: 9.1 MG/DL (ref 8.7–10.2)
CHLORIDE SERPL-SCNC: 96 MMOL/L (ref 96–106)
CHOLEST SERPL-MCNC: 245 MG/DL (ref 100–199)
CO2 SERPL-SCNC: 24 MMOL/L (ref 20–29)
CREAT SERPL-MCNC: 0.86 MG/DL (ref 0.57–1)
CREAT UR-MCNC: 113.7 MG/DL
GLOBULIN SER CALC-MCNC: 3.3 G/DL (ref 1.5–4.5)
GLUCOSE SERPL-MCNC: 301 MG/DL (ref 65–99)
HBA1C MFR BLD: 9.6 % (ref 4.8–5.6)
HDLC SERPL-MCNC: 55 MG/DL
LDLC SERPL CALC-MCNC: 168 MG/DL (ref 0–99)
MICROALBUMIN UR-MCNC: <3 UG/ML
POTASSIUM SERPL-SCNC: 4.6 MMOL/L (ref 3.5–5.2)
PROT SERPL-MCNC: 7.2 G/DL (ref 6–8.5)
SODIUM SERPL-SCNC: 136 MMOL/L (ref 134–144)
TRIGL SERPL-MCNC: 123 MG/DL (ref 0–149)
TSH SERPL DL<=0.005 MIU/L-ACNC: 2.26 UIU/ML (ref 0.45–4.5)
VLDLC SERPL CALC-MCNC: 22 MG/DL (ref 5–40)

## 2021-11-02 ENCOUNTER — OFFICE VISIT (OUTPATIENT)
Dept: ENDOCRINOLOGY | Facility: CLINIC | Age: 46
End: 2021-11-02

## 2021-11-02 ENCOUNTER — TELEPHONE (OUTPATIENT)
Dept: ENDOCRINOLOGY | Facility: CLINIC | Age: 46
End: 2021-11-02

## 2021-11-02 VITALS
TEMPERATURE: 97.7 F | HEART RATE: 94 BPM | HEIGHT: 66 IN | SYSTOLIC BLOOD PRESSURE: 126 MMHG | BODY MASS INDEX: 29.73 KG/M2 | WEIGHT: 185 LBS | DIASTOLIC BLOOD PRESSURE: 76 MMHG

## 2021-11-02 DIAGNOSIS — E10.65 TYPE 1 DIABETES MELLITUS WITH HYPERGLYCEMIA (HCC): Primary | ICD-10-CM

## 2021-11-02 DIAGNOSIS — E55.9 VITAMIN D DEFICIENCY: ICD-10-CM

## 2021-11-02 DIAGNOSIS — E78.2 MIXED HYPERLIPIDEMIA: ICD-10-CM

## 2021-11-02 DIAGNOSIS — Z96.41 INSULIN PUMP STATUS: ICD-10-CM

## 2021-11-02 LAB — GLUCOSE BLDC GLUCOMTR-MCNC: 247 MG/DL (ref 70–105)

## 2021-11-02 PROCEDURE — 82962 GLUCOSE BLOOD TEST: CPT | Performed by: INTERNAL MEDICINE

## 2021-11-02 PROCEDURE — 99214 OFFICE O/P EST MOD 30 MIN: CPT | Performed by: INTERNAL MEDICINE

## 2021-11-02 RX ORDER — PERPHENAZINE 16 MG/1
TABLET, FILM COATED ORAL
Qty: 600 EACH | Refills: 3 | Status: SHIPPED | OUTPATIENT
Start: 2021-11-02 | End: 2022-05-04

## 2021-11-02 NOTE — PATIENT INSTRUCTIONS
Increase exercise as planned.  Restart rosuvastatin & vit D supplements.  Increase basal rate to 1.1 units from 12a-6am.  Call if blood sugars are running under 100 or over 200.  F/u in 6 months, with labs prior.

## 2021-11-02 NOTE — TELEPHONE ENCOUNTER
Patient is on a mini med pump. They use the contour next strips. If not covered please do a PA on it. The accu-chek does not work with her pump.

## 2021-11-04 ENCOUNTER — TELEPHONE (OUTPATIENT)
Dept: ENDOCRINOLOGY | Facility: CLINIC | Age: 46
End: 2021-11-04

## 2021-11-04 ENCOUNTER — OFFICE VISIT (OUTPATIENT)
Dept: OBSTETRICS AND GYNECOLOGY | Facility: CLINIC | Age: 46
End: 2021-11-04

## 2021-11-04 VITALS
BODY MASS INDEX: 28.32 KG/M2 | SYSTOLIC BLOOD PRESSURE: 128 MMHG | WEIGHT: 176.2 LBS | HEIGHT: 66 IN | DIASTOLIC BLOOD PRESSURE: 82 MMHG

## 2021-11-04 DIAGNOSIS — Z12.31 ENCOUNTER FOR SCREENING MAMMOGRAM FOR MALIGNANT NEOPLASM OF BREAST: ICD-10-CM

## 2021-11-04 DIAGNOSIS — Z01.419 ROUTINE GYNECOLOGICAL EXAMINATION: ICD-10-CM

## 2021-11-04 DIAGNOSIS — Z11.51 SPECIAL SCREENING EXAMINATION FOR HUMAN PAPILLOMAVIRUS (HPV): ICD-10-CM

## 2021-11-04 DIAGNOSIS — Z01.419 PAP SMEAR, LOW-RISK: Primary | ICD-10-CM

## 2021-11-04 DIAGNOSIS — Z30.41 ORAL CONTRACEPTIVE USE: ICD-10-CM

## 2021-11-04 DIAGNOSIS — B00.9 HSV-2 INFECTION: ICD-10-CM

## 2021-11-04 LAB

## 2021-11-04 PROCEDURE — 81002 URINALYSIS NONAUTO W/O SCOPE: CPT | Performed by: OBSTETRICS & GYNECOLOGY

## 2021-11-04 PROCEDURE — 81025 URINE PREGNANCY TEST: CPT | Performed by: OBSTETRICS & GYNECOLOGY

## 2021-11-04 PROCEDURE — 99396 PREV VISIT EST AGE 40-64: CPT | Performed by: OBSTETRICS & GYNECOLOGY

## 2021-11-04 RX ORDER — VALACYCLOVIR HYDROCHLORIDE 500 MG/1
500 TABLET, FILM COATED ORAL DAILY
Qty: 90 TABLET | Refills: 3 | Status: SHIPPED | OUTPATIENT
Start: 2021-11-04 | End: 2022-11-09 | Stop reason: SDUPTHER

## 2021-11-04 RX ORDER — NORGESTIMATE AND ETHINYL ESTRADIOL 7DAYSX3 28
1 KIT ORAL DAILY
Qty: 84 TABLET | Refills: 0 | Status: SHIPPED | OUTPATIENT
Start: 2021-11-04 | End: 2022-01-03 | Stop reason: SDUPTHER

## 2021-11-04 NOTE — PROGRESS NOTES
GYN Annual Exam     CC- Here for annual exam.     Luis Wilcox is a 46 y.o. female established patient who presents for annual well woman exam. She is on OCPS and her cycles are every 28-30 days and last 3 days. She needs Valtrex refilled.  We discussed that she does need to get a MMG since she is on a form of HRT and she forgot to have this done last year. She plans on getting one and I will refill OCPS for 3 months.     OB History        2    Para   2    Term   2            AB        Living   2       SAB        IAB        Ectopic        Molar        Multiple        Live Births              Obstetric Comments   2              Menarche: 13  Current contraception: OCP (estrogen/progesterone)  History of abnormal Pap smear: yes -  1 abnormal with normal followup  History of abnormal mammogram: yes - s/p B9 breast bx  Family history of uterine, colon or ovarian cancer: no  Family history of breast cancer: no  H/o STDs: HSV 2  Gardasil: none  Last pap: 2019- normal pap/ neg HPV  Gardasil:missed  CHUNG: none    Health Maintenance   Topic Date Due   • Hepatitis B (1 of 3 - Risk 3-dose series) Never done   • HEPATITIS C SCREENING  Never done   • DIABETIC FOOT EXAM  Never done   • ANNUAL PHYSICAL  2019   • INFLUENZA VACCINE  2021   • Annual Gynecologic Pelvic and Breast Exam  10/01/2021   • HEMOGLOBIN A1C  2022   • PAP SMEAR  2022   • DIABETIC EYE EXAM  09/15/2022   • LIPID PANEL  10/28/2022   • URINE MICROALBUMIN  10/28/2022   • TDAP/TD VACCINES (2 - Td or Tdap) 2028   • Pneumococcal Vaccine 0-64 (2 of 2 - PPSV23) 2042   • COVID-19 Vaccine  Completed       Past Medical History:   Diagnosis Date   • Abnormal Pap smear of cervix     normal pap + HPV ,  both were normal   • Diabetes mellitus (HCC)    • Diabetes mellitus type I (HCC)    • Herpes     HSV 2   • HPV (human papilloma virus) infection    • Hyperlipidemia        Past Surgical History:   Procedure  Laterality Date   • BREAST BIOPSY     • EAR TUBES     • WISDOM TOOTH EXTRACTION           Current Outpatient Medications:   •  clobetasol (TEMOVATE) 0.05 % external solution, APPLY TO AFFECTED AREA EVERY DAY AS NEEDED, Disp: , Rfl:   •  fluticasone (CUTIVATE) 0.05 % cream, APPLY SPARINGLY TWICE A DAY AS NEEDED TO THE AFFECTED AREA ON CHEST AND UNDER BREASTS, Disp: , Rfl:   •  glucose blood (Contour Next Test) test strip, To check blood sugar 6 x / d, Disp: 600 each, Rfl: 3  •  HumaLOG 100 UNIT/ML injection, INJECT A MAXIMUM DAILY     DOSEOF 60 UNITS            SUBCUTANEOUSLY VIA INSULIN PUMP, Disp: 60 mL, Rfl: 3  •  mupirocin (BACTROBAN) 2 % ointment, APPLY TWICE DAILY X 10DAYS TO AXILLAE,UMBILICUS,GROIN AND PERIANAL AREAS, Disp: , Rfl:   •  norgestimate-ethinyl estradiol (Tri-Estarylla) 0.18/0.215/0.25 MG-35 MCG per tablet, Take 1 tablet by mouth Daily., Disp: 84 tablet, Rfl: 0  •  rosuvastatin (CRESTOR) 20 MG tablet, Take 20 mg by mouth Daily., Disp: , Rfl:   •  valACYclovir (VALTREX) 500 MG tablet, Take 1 tablet by mouth Daily., Disp: 90 tablet, Rfl: 3  •  vitamin D (ERGOCALCIFEROL) 1.25 MG (85853 UT) capsule capsule, TAKE 1 CAPSULE BY MOUTH 1 (ONE) TIME PER WEEK., Disp: 12 capsule, Rfl: 3    No Known Allergies    Social History     Tobacco Use   • Smoking status: Never Smoker   • Smokeless tobacco: Never Used   Vaping Use   • Vaping Use: Never used   Substance Use Topics   • Alcohol use: Yes     Comment: occasionally   • Drug use: No       Family History   Problem Relation Age of Onset   • Hyperlipidemia Father    • Diabetes Paternal Grandfather    • Stroke Paternal Grandfather    • No Known Problems Mother    • No Known Problems Brother    • No Known Problems Daughter    • No Known Problems Daughter    • Breast cancer Neg Hx    • Ovarian cancer Neg Hx    • Colon cancer Neg Hx    • Deep vein thrombosis Neg Hx    • Pulmonary embolism Neg Hx        Review of Systems   Constitutional: Positive for activity change  "and unexpected weight change (loss). Negative for appetite change, fatigue and fever.   Eyes: Negative for photophobia and visual disturbance.   Respiratory: Negative for cough and shortness of breath.    Cardiovascular: Negative for chest pain and palpitations.   Gastrointestinal: Negative for abdominal distention (bloating.), abdominal pain, constipation, diarrhea and nausea.   Endocrine: Negative for cold intolerance and heat intolerance.   Genitourinary: Negative for dyspareunia, dysuria, menstrual problem, pelvic pain, vaginal bleeding and vaginal discharge.   Musculoskeletal: Negative for back pain.   Skin: Negative for color change and rash.   Neurological: Negative for headaches.   Hematological: Negative for adenopathy. Does not bruise/bleed easily.   Psychiatric/Behavioral: Negative for dysphoric mood. The patient is not nervous/anxious.        /82   Ht 167.6 cm (66\")   Wt 79.9 kg (176 lb 3.2 oz)   LMP 10/28/2021 (Approximate)   BMI 28.44 kg/m²     Physical Exam   Constitutional: She is oriented to person, place, and time. She appears well-developed.   HENT:   Head: Normocephalic and atraumatic.   Eyes: Conjunctivae are normal. No scleral icterus.   Neck: No thyromegaly present.   Cardiovascular: Normal rate, regular rhythm and normal heart sounds.   Pulmonary/Chest: Effort normal and breath sounds normal. Right breast exhibits mass. Right breast exhibits no inverted nipple, no nipple discharge, no skin change and no tenderness. Left breast exhibits no inverted nipple, no mass, no nipple discharge, no skin change and no tenderness.       Abdominal: Soft. Normal appearance and bowel sounds are normal. She exhibits no distension and no mass. There is no abdominal tenderness. There is no rebound and no guarding. No hernia.   Genitourinary:    Vulva and rectum normal.      Pelvic exam was performed with patient supine.   There is no rash, tenderness or lesion on the right labia. There is no rash, " tenderness or lesion on the left labia. Uterus is not deviated, not enlarged, not fixed and not tender. Cervix exhibits no motion tenderness, no discharge and no friability. Right adnexum displays no mass, no tenderness and no fullness. Left adnexum displays no mass, no tenderness and no fullness.    No vaginal discharge, erythema, tenderness or bleeding.   No erythema, tenderness or bleeding in the vagina.    No foreign body in the vagina.      No signs of injury in the vagina.     Neurological: She is alert and oriented to person, place, and time.   Skin: Skin is warm and dry.   Psychiatric: Her behavior is normal. Mood, judgment and thought content normal.   Nursing note and vitals reviewed.         Assessment/Plan    1) GYN HM: normal pap/HPV 8/2019, check pap/HPV SBE demonstrated and encouraged.  2) STD screening: declines Condoms encouraged.  3) Contraception: OCP (estrogen/progesterone) Discussed with patient correct usage of oral contraceptive pills/patches/rings and what to do for a missed dose.  Patient reminded that condoms are the only form of contraceptive that can also prevent STDs and so use is encouraged with every act of coitus.  We reviewed ACHES warning signs (abdominal pain, chest pain, headache, eye vision changes or severe leg pain and or swelling).  Patient is encouraged to call for any questions or concerns.  Will only refill 3 months until she has her MMG  4) Family Planning: no plans, encourage folic acid daily  5) Diet and Exercise discussed  6) Smoking Status: No  7) .  MMG- past due, enc pt to schedule ASAP. I will not continue to refill OCPS without breast imaging. Pt has moved to [place]  8) Breast mass- pt had normal bx of area in 2017, declines removal. Mass is unchanged but is large and may obscure other findings.  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   10) Parts of this document have been copied or forwarded from her previous visits and have been reviewed, updated and edited  as indicated.   11) I saw the patient with a face mask, gloves and eye protection  The patient herself was masked.  Social distancing was observed as appropriate.  12)Follow up prn or 1 year annual        Diagnoses and all orders for this visit:    1. Pap smear, low-risk (Primary)  -     POC Urinalysis Dipstick  -     POC Pregnancy, Urine  -     IgP, Aptima HPV    2. Routine gynecological examination  -     POC Urinalysis Dipstick  -     POC Pregnancy, Urine  -     IgP, Aptima HPV    3. Special screening examination for human papillomavirus (HPV)  -     POC Urinalysis Dipstick  -     POC Pregnancy, Urine  -     IgP, Aptima HPV    4. Encounter for screening mammogram for malignant neoplasm of breast  -     Mammo Screening Bilateral With CAD; Future    5. HSV-2 infection    6. Oral contraceptive use    Other orders  -     norgestimate-ethinyl estradiol (Tri-Estarylla) 0.18/0.215/0.25 MG-35 MCG per tablet; Take 1 tablet by mouth Daily.  Dispense: 84 tablet; Refill: 0  -     valACYclovir (VALTREX) 500 MG tablet; Take 1 tablet by mouth Daily.  Dispense: 90 tablet; Refill: 3          Carol Schreiber MD  11/4/2021    15:24 EDT

## 2021-11-09 LAB
CYTOLOGIST CVX/VAG CYTO: NORMAL
CYTOLOGY CVX/VAG DOC CYTO: NORMAL
CYTOLOGY CVX/VAG DOC THIN PREP: NORMAL
DX ICD CODE: NORMAL
HIV 1 & 2 AB SER-IMP: NORMAL
HPV I/H RISK 4 DNA CVX QL PROBE+SIG AMP: NEGATIVE
OTHER STN SPEC: NORMAL
STAT OF ADQ CVX/VAG CYTO-IMP: NORMAL

## 2021-11-11 NOTE — TELEPHONE ENCOUNTER
"Note in covermymeds says \"This request cannot be processed due to the medication is not covered by the plan.\"    I called and spoke to the insurance company on the phone. It is a plan exclusion and rep could not do a PA on it. She transferred me to the pharmacy help desk to try and do a plan exclusion override.    I spoke with Marcela at the help desk who said she could not get it overrided. Must try Rouche Lifescan strips first. She also advised since it is a pharmacy plan exclusion the pharmacy can try to bill it under medical.  "

## 2021-11-14 NOTE — PROGRESS NOTES
[facility]        Patient Care Team:  Jarad Valente Jr., DO as PCP - General (Sports Medicine)  Carol Schreiber MD as Consulting Physician (Obstetrics and Gynecology)  Neyda Green MD as Consulting Physician (Endocrinology)    Chief Complaint:    Chief Complaint   Patient presents with   • Diabetes     follow up, Type 1,  1.5hr PP, Basal 12am 1.0, 6am 1.10, 2pm 1.0, 8pm 1.0         Subjective   Here for diabetes f/u  Blood sugars: higher in am  Insulin delivery per pump: Basal 44% / bolus 56%  Exercise program: moving  Not taking vit D nor statin    Interval History:     Patient Complaints: none  Patient Denies:  hypoglycemia  History taken from: patient    Review of Systems:   Review of Systems   Eyes: Negative for blurred vision.   Gastrointestinal: Negative for nausea.   Endocrine: Negative for polyuria.   Neurological: Negative for headache.     Gained 18 lb since last visit    Objective     Vital Signs      Vitals:    11/02/21 1333   BP: 126/76   Pulse: 94   Temp: 97.7 °F (36.5 °C)         Physical Exam:     General Appearance:    Alert, cooperative, in no acute distress   Head:    Normocephalic, without obvious abnormality, atraumatic   Eyes:            Lids and lashes normal, conjunctivae and sclerae normal, no   icterus, no pallor, corneas clear, PERRLA   Throat:   No oral lesions,  oral mucosa moist   Neck:   35 cm in circumference, thyroid soft, no   carotid bruit   Lungs:     Clear     Heart:    Regular rhythm and normal rate   Chest Wall:    No abnormalities observed   Abdomen:     Normal bowel sounds, soft                 Extremities:   Moves all extremities well, no edema               Pulses:   Pulses palpable and equal bilaterally   Skin:   Pump site clean   Neurologic:  DTR 1+, able to feel the 10g monofilament          Results Review:    I have reviewed the patient's new clinical results, labs & imaging.    Medication Review:   Prior to  Admission medications    Medication Sig Start Date End Date Taking? Authorizing Provider   clobetasol (TEMOVATE) 0.05 % external solution APPLY TO AFFECTED AREA EVERY DAY AS NEEDED 7/14/20  Yes Lexie Pham MD   fluticasone (CUTIVATE) 0.05 % cream APPLY SPARINGLY TWICE A DAY AS NEEDED TO THE AFFECTED AREA ON CHEST AND UNDER BREASTS 7/15/20  Yes Lexie Pham MD   HumaLOG 100 UNIT/ML injection INJECT A MAXIMUM DAILY     DOSEOF 60 UNITS            SUBCUTANEOUSLY VIA INSULIN PUMP 11/2/20  Yes Neyda Green MD   mupirocin (BACTROBAN) 2 % ointment APPLY TWICE DAILY X 10DAYS TO AXILLAE,UMBILICUS,GROIN AND PERIANAL AREAS 7/21/20  Yes Lexie Pham MD   rosuvastatin (CRESTOR) 20 MG tablet Take 20 mg by mouth Daily. 9/14/20  Yes Lexie Pham MD   vitamin D (ERGOCALCIFEROL) 1.25 MG (80766 UT) capsule capsule TAKE 1 CAPSULE BY MOUTH 1 (ONE) TIME PER WEEK. 7/1/21  Yes Neyda Green MD   glucose blood (Contour Next Test) test strip To check blood sugar 6 x / d 11/2/21   Neyda Green MD   norgestimate-ethinyl estradiol (Tri-Estarylla) 0.18/0.215/0.25 MG-35 MCG per tablet Take 1 tablet by mouth Daily. 11/4/21   Carol Schreiber MD   valACYclovir (VALTREX) 500 MG tablet Take 1 tablet by mouth Daily. 11/4/21   Carol Schreiber MD       Lab Results (most recent)     Procedure Component Value Units Date/Time    POC Glucose [[id number]]  (Abnormal) Collected: 11/02/21 1337    Specimen: Blood Updated: 11/02/21 1339     Glucose 247 mg/dL      Comment: Serial Number: [id number]:  [id number]           Lab Results   Component Value Date    HGBA1C 9.6 (H) 10/28/2021    HGBA1C 9.2 (H) 04/27/2021    HGBA1C 8.2 (H) 11/03/2020      Lab Results   Component Value Date    GLUCOSE 301 (H) 10/28/2021    BUN 15 10/28/2021    CREATININE 0.86 10/28/2021    EGFRIFNONA 82 10/28/2021    EGFRIFAFRI 95 10/28/2021    BCR 17 10/28/2021    K 4.6 10/28/2021    CO2 24 10/28/2021    CALCIUM  9.1 10/28/2021    PROTENTOTREF 7.2 10/28/2021    ALBUMIN 3.9 10/28/2021    LABIL2 1.2 10/28/2021    AST 17 10/28/2021    ALT 12 10/28/2021     (H) 10/28/2021    HDL 55 10/28/2021    TRIG 123 10/28/2021     Lab Results   Component Value Date    TSH 2.260 10/28/2021    JWET83MG 31.2 10/28/2021     Microalb/cr ratio <3    Assessment/Plan     Diagnoses and all orders for this visit:    1. Type 1 diabetes mellitus with hyperglycemia (HCC) (Primary) - worse  -     glucose blood (Contour Next Test) test strip; To check blood sugar 6 x / d  Dispense: 600 each; Refill: 3  -     Hemoglobin A1c; Future    2. Mixed hyperlipidemia- worse    3. Vitamin D deficiency - stable    4. Insulin pump status    Other orders  -     POC Glucose    Wearing Minimed 670G pump since 8/2017. Not using sensors.    Increase exercise as planned.  Restart rosuvastatin & vit D supplements.  Increase basal rate to 1.1 units from 12a-6am.  Call if blood sugars are running under 100 or over 200.        Neyda Green MD  11/13/21  22:34 EST

## 2021-12-02 DIAGNOSIS — E10.65 TYPE 1 DIABETES MELLITUS WITH HYPERGLYCEMIA (HCC): ICD-10-CM

## 2021-12-06 ENCOUNTER — TELEPHONE (OUTPATIENT)
Dept: ENDOCRINOLOGY | Facility: CLINIC | Age: 46
End: 2021-12-06

## 2021-12-06 NOTE — TELEPHONE ENCOUNTER
"Patient sent email stating she is still not able to get her Contour Next test strips. I tried to do a PA again and it says \"plan exclusion. Not able to complete PA at this time\" Key: [id number] in covermymeds. I emailed this information back to the patient. She may need to see if her pharmacy can bill it under medical.  "

## 2022-01-04 RX ORDER — NORGESTIMATE AND ETHINYL ESTRADIOL 7DAYSX3 28
1 KIT ORAL DAILY
Qty: 84 TABLET | Refills: 0 | Status: SHIPPED | OUTPATIENT
Start: 2022-01-04 | End: 2022-03-30

## 2022-03-22 ENCOUNTER — TELEPHONE (OUTPATIENT)
Dept: ENDOCRINOLOGY | Facility: CLINIC | Age: 47
End: 2022-03-22

## 2022-03-30 RX ORDER — NORGESTIMATE AND ETHINYL ESTRADIOL 7DAYSX3 28
KIT ORAL
Qty: 84 TABLET | Refills: 0 | Status: SHIPPED | OUTPATIENT
Start: 2022-03-30 | End: 2022-07-07

## 2022-04-28 LAB
HBA1C MFR BLD: 10.7 %
HBA1C MFR BLD: 10.7 % (ref 4.8–5.6)

## 2022-05-03 ENCOUNTER — OFFICE VISIT (OUTPATIENT)
Dept: SPORTS MEDICINE | Facility: CLINIC | Age: 47
End: 2022-05-03

## 2022-05-03 ENCOUNTER — HOSPITAL ENCOUNTER (OUTPATIENT)
Dept: GENERAL RADIOLOGY | Facility: HOSPITAL | Age: 47
Discharge: HOME | End: 2022-05-03
Admitting: FAMILY MEDICINE

## 2022-05-03 VITALS
TEMPERATURE: 97 F | DIASTOLIC BLOOD PRESSURE: 76 MMHG | HEART RATE: 74 BPM | OXYGEN SATURATION: 99 % | BODY MASS INDEX: 28.28 KG/M2 | RESPIRATION RATE: 16 BRPM | WEIGHT: 176 LBS | HEIGHT: 66 IN | SYSTOLIC BLOOD PRESSURE: 128 MMHG

## 2022-05-03 DIAGNOSIS — M75.02 ADHESIVE CAPSULITIS OF LEFT SHOULDER: Primary | ICD-10-CM

## 2022-05-03 DIAGNOSIS — M25.512 LEFT SHOULDER PAIN, UNSPECIFIED CHRONICITY: ICD-10-CM

## 2022-05-03 PROCEDURE — 73030 X-RAY EXAM OF SHOULDER: CPT

## 2022-05-03 PROCEDURE — 99214 OFFICE O/P EST MOD 30 MIN: CPT | Performed by: FAMILY MEDICINE

## 2022-05-03 RX ORDER — CELECOXIB 100 MG/1
100 CAPSULE ORAL 2 TIMES DAILY PRN
Qty: 60 CAPSULE | Refills: 0 | Status: SHIPPED | OUTPATIENT
Start: 2022-05-03 | End: 2022-11-09

## 2022-05-03 NOTE — PROGRESS NOTES
"Luis is a 46 y.o. year old female presents to [facility]    Chief Complaint   Patient presents with   • Shoulder Pain     Left shoulder pain for 5 months, KNI. Reports a decrease in ROM        History of Present Illness  Ms. Wilcox is a 46-year-old type I diabetic female presenting with left shoulder pain and decreased range of motion.  She reports pain at night. Has been present for greater than 3 months. Denies any injury. Reports this sensation in the past and resolved with exercises and stretching. However, these exercises have not been beneficial. She reports her most recent A1C is 10.7 and glucose was 167 at lunch today. She adds she will be seeing her endocrinologist next week to make adjustments.    I have reviewed the patient's medical, family, and social history in detail and updated the computerized patient record.    /76 (BP Location: Right arm, Patient Position: Sitting, Cuff Size: Adult)   Pulse 74   Temp 97 °F (36.1 °C)   Resp 16   Ht 167.6 cm (66\")   Wt 79.8 kg (176 lb)   SpO2 99%   BMI 28.41 kg/m²      Physical Exam    Mask worn thru encounter  Vital signs reviewed.   General: No acute distress.  Eyes: conjunctiva clear; pupils equally round and reactive  ENT: external ears atraumatic  CV: no peripheral edema  Resp: normal respiratory effort, no use of accessory muscles  Skin: no rashes or wounds; normal turgor  Psych: mood and affect appropriate; recent and remote memory intact  Neuro: sensation to light touch intact    MSK Exam    Left shoulder:  forward flexion to 120  90 of abduction  60 of external rotation  60 of internal rotation     XR Shoulder 2+ View Left (05/03/2022 13:42)  Independent review. No fx. Joint space well maintained.             Diagnoses and all orders for this visit:    Adhesive capsulitis of left shoulder  -     Ambulatory Referral to Physical Therapy  -     celecoxib (CeleBREX) 100 MG capsule; Take 1 capsule by mouth 2 (Two) " Times a Day As Needed for Mild Pain .    Left shoulder pain, unspecified chronicity  -     XR Shoulder 2+ View Left; Future  -     XR Shoulder 2+ View Left      Plan: Would recommend subacromial steroid injection to expedite recovery, however due to her recent uncontrolled diabetes will hold off at this time. Instead recommend physical therapy to address increasing range of motion.       Follow Up   Return in about 8 weeks (around 6/28/2022) for F/u  on PT progress.  Patient was given instructions and counseling regarding her condition or for health maintenance advice. Please see specific information pulled into the AVS if appropriate.     EMR Dragon/Transcription disclaimer:    Much of this encounter note is an electronic transcription/translation of spoken language to printed text.  The electronic translation of spoken language may permit erroneous, or at times, nonsensical words or phrases to be inadvertently transcribed.  Although I have reviewed the note for such errors some may still exist.

## 2022-05-04 ENCOUNTER — OFFICE VISIT (OUTPATIENT)
Dept: ENDOCRINOLOGY | Facility: CLINIC | Age: 47
End: 2022-05-04

## 2022-05-04 VITALS
WEIGHT: 189 LBS | DIASTOLIC BLOOD PRESSURE: 88 MMHG | BODY MASS INDEX: 30.37 KG/M2 | SYSTOLIC BLOOD PRESSURE: 136 MMHG | HEART RATE: 84 BPM | HEIGHT: 66 IN

## 2022-05-04 DIAGNOSIS — Z96.41 INSULIN PUMP STATUS: ICD-10-CM

## 2022-05-04 DIAGNOSIS — E10.65 TYPE 1 DIABETES MELLITUS WITH HYPERGLYCEMIA: Primary | ICD-10-CM

## 2022-05-04 DIAGNOSIS — E55.9 VITAMIN D DEFICIENCY: ICD-10-CM

## 2022-05-04 DIAGNOSIS — E78.2 MIXED HYPERLIPIDEMIA: ICD-10-CM

## 2022-05-04 LAB — GLUCOSE BLDC GLUCOMTR-MCNC: 279 MG/DL (ref 70–105)

## 2022-05-04 PROCEDURE — 82962 GLUCOSE BLOOD TEST: CPT | Performed by: INTERNAL MEDICINE

## 2022-05-04 PROCEDURE — 99214 OFFICE O/P EST MOD 30 MIN: CPT | Performed by: INTERNAL MEDICINE

## 2022-05-04 RX ORDER — BLOOD SUGAR DIAGNOSTIC
1 STRIP MISCELLANEOUS AS NEEDED
COMMUNITY

## 2022-05-04 NOTE — PATIENT INSTRUCTIONS
Schedule pump preview, 2nd part.  See eye doctor in Sept.  Increase exercise as planned.  Take bolus before eating.  Continue same pump settings & rosuvastatin.  Restart vit D supplements.  Call if blood sugars are running under 100 or over 200.  F/u in 6 months, with fasting labs prior.

## 2022-05-05 NOTE — PROGRESS NOTES
[facility]        Patient Care Team:  Jarad Valente Jr., DO as PCP - General (Sports Medicine)  Carol Schreiber MD as Consulting Physician (Obstetrics and Gynecology)  Neyda Green MD as Consulting Physician (Endocrinology)    Chief Complaint:    Chief Complaint   Patient presents with   • Diabetes     Type 1, , 4.5 hr PP, Basal 12am 1.10, 6am 1.10, 2pm 1.0, 8pm 1.0         Subjective   Here for diabetes f/u  Blood sugars: in the 200's  Exercise program: not much  Not taking vit D    Interval History:     Patient Complaints: pump out of warranty. Needs upgrade. Not wearing semsors due to cost  Patient Denies: severe hypoglycemia  History taken from: patient    Review of Systems:   Review of Systems   Constitutional: Positive for unexpected weight gain.   Eyes: Negative for blurred vision.   Gastrointestinal: Negative for nausea.   Endocrine: Negative for polyuria.   Neurological: Negative for headache.   Psychiatric/Behavioral: Positive for depressed mood.     Gained 4 lb since last visit    Objective     Vital Signs      Vitals:    05/04/22 1228   BP: 136/88   Pulse: 84         Physical Exam:     General Appearance:    Alert, cooperative, in no acute distress   Head:    Normocephalic, without obvious abnormality, atraumatic   Eyes:            Lids and lashes normal, conjunctivae and sclerae normal, no   icterus, no pallor, corneas clear, PERRLA   Throat:   No oral lesions,  oral mucosa moist   Neck:   36 cm in circumference, thyroid soft, no   carotid bruit   Lungs:     Clear    Heart:    Regular rhythm and normal rate   Chest Wall:    No abnormalities observed   Abdomen:     Normal bowel sounds, soft                 Extremities:   Moves all extremities well, no edema               Pulses:   Pulses palpable and equal bilaterally   Skin:   Pump site clean   Neurologic:  DTR 1+ in ankles, vibratory sense 25% decreased in toes, able to feel the 10g monofilament (  same as 1y ago )            Results Review:    I have reviewed the patient's new clinical results, labs & imaging.    Medication Review:   Prior to Admission medications    Medication Sig Start Date End Date Taking? Authorizing Provider   celecoxib (CeleBREX) 100 MG capsule Take 1 capsule by mouth 2 (Two) Times a Day As Needed for Mild Pain . 5/3/22  Yes Jarad Valente Jr., DO   clobetasol (TEMOVATE) 0.05 % external solution APPLY TO AFFECTED AREA EVERY DAY AS NEEDED 7/14/20  Yes Lexie Pham MD   fluticasone (CUTIVATE) 0.05 % cream APPLY SPARINGLY TWICE A DAY AS NEEDED TO THE AFFECTED AREA ON CHEST AND UNDER BREASTS 7/15/20  Yes Lexie Pham MD   glucose blood (Accu-Chek Guide) test strip 1 each by Other route As Needed. Use as instructed   Yes Lexie Pham MD   HumaLOG 100 UNIT/ML injection INJECT A MAXIMUM DAILY     DOSE OF 60 UNITS            SUBCUTANEOUSLY VIA        INSULIN  PUMP 12/3/21  Yes Neyda Green MD   mupirocin (BACTROBAN) 2 % ointment APPLY TWICE DAILY X 10DAYS TO AXILLAE,UMBILICUS,GROIN AND PERIANAL AREAS 7/21/20  Yes Lexie Pham MD   rosuvastatin (CRESTOR) 20 MG tablet Take 20 mg by mouth Daily. 9/14/20  Yes Lexie Pham MD   Tri-Estarylla 0.18/0.215/0.25 MG-35 MCG per tablet TAKE 1 TABLET DAILY 3/30/22  Yes Carol Schreiber MD   valACYclovir (VALTREX) 500 MG tablet Take 1 tablet by mouth Daily. 11/4/21  Yes Carol Schreiber MD   vitamin D (ERGOCALCIFEROL) 1.25 MG (05231 UT) capsule capsule TAKE 1 CAPSULE BY MOUTH 1 (ONE) TIME PER WEEK. 7/1/21  Yes Neyda Green MD       Lab Results (most recent)     Procedure Component Value Units Date/Time    POC Glucose [[id number]]  (Abnormal) Collected: 05/04/22 1232    Specimen: Blood Updated: 05/04/22 1238     Glucose 279 mg/dL      Comment: Serial Number: [id number]:  [id number]           Lab Results   Component Value Date    HGBA1C 10.7 (H) 04/27/2022    HGBA1C 10.7  04/27/2022    HGBA1C 9.6 (H) 10/28/2021      Lab Results   Component Value Date    GLUCOSE 301 (H) 10/28/2021    BUN 15 10/28/2021    CREATININE 0.86 10/28/2021    EGFRIFNONA 82 10/28/2021    EGFRIFAFRI 95 10/28/2021    BCR 17 10/28/2021    K 4.6 10/28/2021    CO2 24 10/28/2021    CALCIUM 9.1 10/28/2021    PROTENTOTREF 7.2 10/28/2021    ALBUMIN 3.9 10/28/2021    LABIL2 1.2 10/28/2021    AST 17 10/28/2021    ALT 12 10/28/2021     (H) 10/28/2021    HDL 55 10/28/2021    TRIG 123 10/28/2021     Lab Results   Component Value Date    TSH 2.260 10/28/2021    KBFP88HZ 31.2 10/28/2021       Assessment/Plan     Diagnoses and all orders for this visit:    1. Type 1 diabetes mellitus with hyperglycemia (HCC) (Primary) - worse  -     Ambulatory Referral to Diabetic Education  -     Hemoglobin A1c; Future  -     Microalbumin / Creatinine Urine Ratio - Urine, Clean Catch; Future    2. Mixed hyperlipidemia - will check status next visit  -     Comprehensive Metabolic Panel; Future  -     Lipid Panel; Future  -     TSH; Future    3. Vitamin D deficiency - will check status next visit  -     Vitamin D 25 Hydroxy; Future    4. Insulin pump status    Other orders  -     POC Glucose    Wearing Minimed 670G pump since 8/2017. Not using sensors.    Schedule pump preview, 2nd part.  See eye doctor in Sept.  Increase exercise as planned.  Take bolus before eating.  Continue same pump settings & rosuvastatin.  Restart vit D supplements.  Call if blood sugars are running under 100 or over 200.        Neyda Green MD  05/05/22  12:34 EDT

## 2022-05-06 ENCOUNTER — TREATMENT (OUTPATIENT)
Dept: PHYSICAL THERAPY | Facility: CLINIC | Age: 47
End: 2022-05-06

## 2022-05-06 ENCOUNTER — PATIENT ROUNDING (BHMG ONLY) (OUTPATIENT)
Dept: SPORTS MEDICINE | Facility: CLINIC | Age: 47
End: 2022-05-06

## 2022-05-06 DIAGNOSIS — M54.2 CERVICALGIA: ICD-10-CM

## 2022-05-06 DIAGNOSIS — G89.29 CHRONIC LEFT SHOULDER PAIN: Primary | ICD-10-CM

## 2022-05-06 DIAGNOSIS — M54.6 PAIN IN THORACIC SPINE: ICD-10-CM

## 2022-05-06 DIAGNOSIS — M25.512 CHRONIC LEFT SHOULDER PAIN: Primary | ICD-10-CM

## 2022-05-06 PROCEDURE — 97161 PT EVAL LOW COMPLEX 20 MIN: CPT | Performed by: PHYSICAL THERAPIST

## 2022-05-06 PROCEDURE — 97112 NEUROMUSCULAR REEDUCATION: CPT | Performed by: PHYSICAL THERAPIST

## 2022-05-06 PROCEDURE — 97110 THERAPEUTIC EXERCISES: CPT | Performed by: PHYSICAL THERAPIST

## 2022-05-06 NOTE — PROGRESS NOTES
May 6, 2022    A [employer] Message has been sent to the patient for PATIENT ROUNDING with [facility]

## 2022-05-09 NOTE — PROGRESS NOTES
Physical Therapy Initial Evaluation and Plan of Care      Patient: Luis Wilcox   : 1975  Diagnosis/ICD-10 Code:  Chronic left shoulder pain [M25.512, G89.29]  Referring practitioner: Jarad Valente *  Date of Initial Visit: 2022  Today's Date: 2022  Patient seen for 1 session       Visit Diagnoses:    ICD-10-CM ICD-9-CM   1. Chronic left shoulder pain  M25.512 719.41    G89.29 338.29   2. Cervicalgia  M54.2 723.1   3. Pain in thoracic spine  M54.6 724.1         Subjective  Chief Complaint/Subjective Report: Patient presented to the clinic today with complaints of pain and stiffness in the L shoulder worsening over the past 3 months. Pt reported that she has a PMH of Type 1 DM which has been poorly managed recently. Pt reproted that the MD has referred her to PT with frozen shoulder. Patient reported no significant medical history today aside from that previously mentioned; no reports of CNS signs or symptoms, or indications of other sinister pathologies were given in the subjective history today.  Mechanism of Injury: Unknown  Functional Limitations: ADLs, work-related activities  Subjective Goals for PT: Return to PLOF, decreased pain with ADLs and community activities  Prior Treatment for Current Condition: MD  Imaging:Yes  Pain/VNRS (0-10/10): Worst: 7/10; Average: 0/10  Agg. Factors: Reaching, UE use  Relieving Factors: -  Subjective Questionnaire: QuickDASH: 21  PLOF: Independent with all functional tasks, ADLs, and community activities  Occupation:   Social:   PMH: See history section of patient chart  Precautions/Contraindications: No reported contraindications from subjective history today unless otherwise stated above.      Objective  AROM (% of Full --- * denotes degrees in place of % --- + denotes tested to be WFL)       -- Cervical Rotation Right:  85 Left:  75    -- Thoracic Rotation Right 85 Left 85    -- Cervical Flexion:   85      -- Cervical  Extension:  75                   AROM Shoulder (% of Full --- * denotes degrees in place of % --- + denotes tested to be WFL)      -- Shoulder Flexion Right: + Left: 75    -- Shoulder Abduction Right: + Left: 65     -- Shoulder ER at Side Right: + Left: 65  -- Shoulder IR Behind Back: + Left: 65    UE MMT (0-5/5 --- + denotes WFL)  -- Shoulder Flexion Right: +/5 Left: 4+/5  -- Shoulder Abduction Right: +/5 Left: 4+/5  -- Shoulder ER at Side Right: 5-/5 Left: 4/5  -- Shoulder IR Behind Back: -/5 Left: -/5  -- Elbow Flexion Right: -/5 Left: -/5  -- Elbow Extension Right  -/5 Left: -/5  -- Wrist Flexion Right:  --/5 Left: -/5  -- Wrist Extension Right: -/5 Left: -/5      Functional Assessment: Impaired overhead and behind back reach    See Exercise, Manual, and Modality Logs for complete treatment.       Documentation of Assessment Details: Patient presented the clinic with signs and symptoms consistent with frozen shoulder. Patient demonstrated limitations and impairments in functional mobility and strength during today's evaluation, and will benefit from skilled PT address current limitations and impairments to help patient regain functional mobility and strength necessary to return to PLOF, reduce pain, and improve current symptoms as patient progresses towards meeting current goals established at therapy today. The patient present with no comorbidities or personal factors that impact my POC and deficit in above mentioned areas. Based on these findings and results from valid tests and measures, I am classifying this patient's presentation as stable with uncomplicated characteristics, and a good prognosis for recovery.     Assessment & Plan     Assessment  Impairments: abnormal gait, abnormal or restricted ROM, activity intolerance, impaired physical strength, lacks appropriate home exercise program and pain with function  Functional Limitations: carrying objects, lifting, sleeping, walking, uncomfortable because of  pain, sitting and standingPrognosis details: Based on valid tests and measures performed today I am classifying this patient as presently stable with uncomplicated characteristics and good prognosis for recovery    Goals  Plan Goals: Pt will improve Subjective assessment by >75% within 6 weeks to demonstrate improvements in test and measure outcomes and overall functionality, and to show reduction of symptoms, improved activity tolerance, and ability to complete ADLs and work-related activities     Pt will improve functional mobility and pain free ROM to ranges that allow for pain free functional activities such as dressing, bathing, and completing ADLs within 8 weeks to demonstrate improvements in functional independence, mobility, and community participation to allow for a return to PLOF.    Pt will demonstrate 80% full ROM for all measured ROMs within 8 weeks to demonstrate improvements in functional mobility and ability to complete ADLs and work-related activities Independently.    Pt will sleep through the night without waking d/t current symptoms >5/7 nights per week within 8 weeks to demonstrate improvements restful sleep, overall function, and symptom reduction    Pt will report pain <2/10 at worst with activity and at rest within 8 weeks to demonstrate improvements in pain-free ROM and function to improve functional mobility, activities tolerance, and ability to complete ADLs and work-related activities    Pt will be able to lift and carry objects >30lbs without worsening of symptoms within 8 weeks to demonstrate improvements in functional mobility for ease of home and community tasks and improved functional independence.    Pt will be able to ambulate >30 mins independently without worsening of symptoms within 8 weeks to demonstrate improvements in functional mobility for ease of home and community ambulation and improved functional independence        Plan  Planned modality interventions: dry needling,  TENS, high voltage pulsed current (pain management), electrical stimulation/Russian stimulation and cryotherapy  Planned therapy interventions: ADL retraining, abdominal trunk stabilization, manual therapy, neuromuscular re-education, balance/weight-bearing training, body mechanics training, soft tissue mobilization, spinal/joint mobilization, joint mobilization, home exercise program, gait training, functional ROM exercises, strengthening, therapeutic activities and transfer training  Plan details: Duration: 2-3x/Wk for 4 Weeks - Upon completion of 4 weeks further evaluation and assessment with determine ongoing plan for continued care.    Continue with skilled physical therapy addressing previously mentioned limitations and impairments; progress HEP as tolerated; progress functional strengthening interventions to tolerance.        History # of Personal Factors and/or Comorbidities: LOW (0)  Examination of Body System(s): # of elements: LOW (1-2)  Clinical Presentation: STABLE   Clinical Decision Making: LOW       Timed:         Manual Therapy:    -     mins  48046;     Therapeutic Exercise:    15     mins  68067;     Neuromuscular Miguel A:    10    mins  77655;    Therapeutic Activity:     -     mins  35453;     Gait Training:           mins  97657;     Ultrasound:          mins  64477;    Ionto                                  mins   37995  Self Care                           mins   00755  Canalith Repos         mins 14912    Un-Timed:  Electrical Stimulation:          mins  03111 ( );  Dry Needling         mins self-pay  Traction         mins 28060  Low Eval    25     Mins  20794  Mod Eval         Mins  07028  High Eval                            Mins  03854    Timed Treatment:   25   mins   Total Treatment:     50   mins      PT: Chandana Pace PT     License Number: [id number]  Electronically signed by Chandana Pace PT, 05/09/22, 1:01 PM EDT    Certification Period: 5/9/2022 thru 8/6/2022  I certify that  the therapy services are furnished while this patient is under my care.  The services outlined above are required by this patient, and will be reviewed every 90 days.         Physician Signature:__________________________________________________    PHYSICIAN: Jarad Valente Jr.,   NPI: [id number]                                      DATE:      Please sign and return via fax to [email or web address] . Thank you, [facility].

## 2022-05-23 ENCOUNTER — TREATMENT (OUTPATIENT)
Dept: PHYSICAL THERAPY | Facility: CLINIC | Age: 47
End: 2022-05-23

## 2022-05-23 DIAGNOSIS — M54.6 PAIN IN THORACIC SPINE: ICD-10-CM

## 2022-05-23 DIAGNOSIS — M54.2 CERVICALGIA: ICD-10-CM

## 2022-05-23 DIAGNOSIS — M25.512 CHRONIC LEFT SHOULDER PAIN: Primary | ICD-10-CM

## 2022-05-23 DIAGNOSIS — G89.29 CHRONIC LEFT SHOULDER PAIN: Primary | ICD-10-CM

## 2022-05-23 PROCEDURE — 97530 THERAPEUTIC ACTIVITIES: CPT | Performed by: PHYSICAL THERAPIST

## 2022-05-23 PROCEDURE — 97140 MANUAL THERAPY 1/> REGIONS: CPT | Performed by: PHYSICAL THERAPIST

## 2022-05-23 PROCEDURE — 97112 NEUROMUSCULAR REEDUCATION: CPT | Performed by: PHYSICAL THERAPIST

## 2022-05-23 PROCEDURE — 97110 THERAPEUTIC EXERCISES: CPT | Performed by: PHYSICAL THERAPIST

## 2022-05-24 NOTE — PROGRESS NOTES
Physical Therapy Daily Progress Note    Patient: Luis Wilcox   : 1975  Diagnosis/ICD-10 Code:  Chronic left shoulder pain [M25.512, G89.29]  Referring practitioner: Jarad Valente *  Date of Initial Visit: Type: THERAPY  Noted: 2022  Today's Date: 2022  Patient seen for 2 session       Visit Diagnoses:    ICD-10-CM ICD-9-CM   1. Chronic left shoulder pain  M25.512 719.41    G89.29 338.29   2. Cervicalgia  M54.2 723.1   3. Pain in thoracic spine  M54.6 724.1            Subjective  Luis Wilcox reported today that she's doing a little better today    Objective   No functional measures updated at today's visit unless otherwise stated. For functional assessment and documentation of patient progressions referred to the assessment section.    See Exercise, Manual, and Modality Logs for complete treatments.       Assessment/Plan  Treatment today addressed current limitations and impairments with progressive functional strengthening and neurodyanmic mobility to help patient improve current symptoms and progress towards current goals. All activities today were performed without complaint or worsening of symptoms. VC was provided during interventions when necessary to address appropriate form with interventions. Patient tolerated treatment well today with no adverse events and is progressing well with current treatment plan. Patient will continue to benefit from PT addressing current limitations and impairments to help patient regain necessary function and meet current goals      Continue with current treatment plan and ongoing assessment; progress interventions to tolerance         Timed:         Manual Therapy:    10     mins  23960;     Therapeutic Exercise:    15     mins  10442;     Neuromuscular Miguel A:    10    mins  64904;    Therapeutic Activity:     10     mins  29636;     Gait Training:           mins  26123;     Ultrasound:          mins  80310;    Ionto                                    mins   72901  Self Care                            mins   84162  Canalith Repos         mins 68890    Un-Timed:  Electrical Stimulation:         mins  42863 ( );  Dry Needling     -     mins self-pay  Traction          mins 63879  Low Eval          Mins  13882  Mod Eval          Mins  81388  High Eval                            Mins  59372      Timed Treatment:   45   mins   Total Treatment:     45   mins      PT: Chandana Pace PT     License Number: [id number]  Electronically signed by Chandana Pace PT, 05/24/22, 9:27 AM EDT

## 2022-05-26 ENCOUNTER — TREATMENT (OUTPATIENT)
Dept: PHYSICAL THERAPY | Facility: CLINIC | Age: 47
End: 2022-05-26

## 2022-05-26 ENCOUNTER — OFFICE VISIT (OUTPATIENT)
Dept: ENDOCRINOLOGY | Facility: CLINIC | Age: 47
End: 2022-05-26

## 2022-05-26 DIAGNOSIS — M54.2 CERVICALGIA: ICD-10-CM

## 2022-05-26 DIAGNOSIS — G89.29 CHRONIC LEFT SHOULDER PAIN: Primary | ICD-10-CM

## 2022-05-26 DIAGNOSIS — M25.512 CHRONIC LEFT SHOULDER PAIN: Primary | ICD-10-CM

## 2022-05-26 DIAGNOSIS — E10.65 TYPE 1 DIABETES MELLITUS WITH HYPERGLYCEMIA: ICD-10-CM

## 2022-05-26 DIAGNOSIS — M54.6 PAIN IN THORACIC SPINE: ICD-10-CM

## 2022-05-26 PROCEDURE — 97110 THERAPEUTIC EXERCISES: CPT | Performed by: PHYSICAL THERAPIST

## 2022-05-26 PROCEDURE — 97530 THERAPEUTIC ACTIVITIES: CPT | Performed by: PHYSICAL THERAPIST

## 2022-05-26 PROCEDURE — 97112 NEUROMUSCULAR REEDUCATION: CPT | Performed by: PHYSICAL THERAPIST

## 2022-05-26 NOTE — PROGRESS NOTES
Put in no charge for patient.  Pt was only present to see pump reps. Pt would like to try to get the Omnipod 5 but only get the Dexcom G6 if her insurance approves the Omnipod 5.  Pt already filled out paperwork for Omnipod rep.

## 2022-05-27 ENCOUNTER — TELEPHONE (OUTPATIENT)
Dept: ENDOCRINOLOGY | Facility: CLINIC | Age: 47
End: 2022-05-27

## 2022-05-27 NOTE — PROGRESS NOTES
Physical Therapy Daily Progress Note    Patient: Luis Wilcox   : 1975  Diagnosis/ICD-10 Code:  Chronic left shoulder pain [M25.512, G89.29]  Referring practitioner: Jarad Valente *  Date of Initial Visit: Type: THERAPY  Noted: 2022  Today's Date: 2022  Patient seen for 3 session       Visit Diagnoses:    ICD-10-CM ICD-9-CM   1. Chronic left shoulder pain  M25.512 719.41    G89.29 338.29   2. Cervicalgia  M54.2 723.1   3. Pain in thoracic spine  M54.6 724.1            Subjective  Luis Wilcox reported today that she's doing well    Objective   No functional measures updated at today's visit unless otherwise stated. For functional assessment and documentation of patient progressions referred to the assessment section.    See Exercise, Manual, and Modality Logs for complete treatments.       Assessment/Plan  Tx today continued with emphasis of progressive functional strengthening to address current limitations and impairments in mobility and function strength/stability of involved areas. Pt appears to be progressing well with current treatment plan; appears to be progressing well with functional strength and mobility, per gross assessment and patient report. Pt continues to have limitations in the above mentioned areas, but is progressing well towards current goals and will continue to benefit from skilled PT to help pt regain functional mobility and strength necessary to reduce symptoms and return to PLOF.      Continue with current treatment plan and ongoing assessment; progress interventions to tolerance         Timed:         Manual Therapy:    -     mins  21285;     Therapeutic Exercise:    15     mins  41135;     Neuromuscular Miguel A:    10    mins  62746;    Therapeutic Activity:     15     mins  67687;     Gait Training:           mins  64751;     Ultrasound:          mins  91716;    Ionto                                   mins   30300  Self Care                            mins    37588  [facility]         mins 85013    Un-Timed:  Electrical Stimulation:         mins  98496 ( );  Dry Needling     -     mins self-pay  Traction          mins 03889  Low Eval          Mins  93074  Mod Eval          Mins  77014  High Eval                            Mins  47089      Timed Treatment:   40   mins   Total Treatment:     40   mins      PT: Chandana Pace PT     License Number: [id number]  Electronically signed by Chandana Pace PT, 05/27/22, 12:28 PM EDT

## 2022-05-31 ENCOUNTER — TREATMENT (OUTPATIENT)
Dept: PHYSICAL THERAPY | Facility: CLINIC | Age: 47
End: 2022-05-31

## 2022-05-31 DIAGNOSIS — M54.2 CERVICALGIA: ICD-10-CM

## 2022-05-31 DIAGNOSIS — M25.512 CHRONIC LEFT SHOULDER PAIN: Primary | ICD-10-CM

## 2022-05-31 DIAGNOSIS — G89.29 CHRONIC LEFT SHOULDER PAIN: Primary | ICD-10-CM

## 2022-05-31 DIAGNOSIS — M54.6 PAIN IN THORACIC SPINE: ICD-10-CM

## 2022-05-31 PROCEDURE — 97140 MANUAL THERAPY 1/> REGIONS: CPT | Performed by: PHYSICAL THERAPIST

## 2022-05-31 PROCEDURE — 97110 THERAPEUTIC EXERCISES: CPT | Performed by: PHYSICAL THERAPIST

## 2022-05-31 PROCEDURE — 97112 NEUROMUSCULAR REEDUCATION: CPT | Performed by: PHYSICAL THERAPIST

## 2022-06-01 NOTE — PROGRESS NOTES
Physical Therapy Daily Progress Note    Patient: Luis Wilcox   : 1975  Diagnosis/ICD-10 Code:  Chronic left shoulder pain [M25.512, G89.29]  Referring practitioner: Jarad Valente *  Date of Initial Visit: Type: THERAPY  Noted: 2022  Today's Date: 2022  Patient seen for 4 session       Visit Diagnoses:    ICD-10-CM ICD-9-CM   1. Chronic left shoulder pain  M25.512 719.41    G89.29 338.29   2. Cervicalgia  M54.2 723.1   3. Pain in thoracic spine  M54.6 724.1            Subjective  Luis Wilcox reported today that she's doing better, feels like things are improving a bit. Still very stiff in the shoulder    Objective   No functional measures updated at today's visit unless otherwise stated. For functional assessment and documentation of patient progressions referred to the assessment section.    See Exercise, Manual, and Modality Logs for complete treatments.       Assessment/Plan  Pt's limitations and impairments were addressed in treatment today with progressive functional strengthening and neurodynamic mobility, which pt tolerated well and without complaint. VC was provided during interventions to promote proper form and strengthening of target areas; pt appears to be progressing well with functional mobility, per gross assessment and pt report. Pt continues to have limitations in the aforementioned areas, and will continue to benefit from ongoing skilled PT to help pt regain functional mobility and strength to meet LTG and return to PLOF.      Continue with current treatment plan and ongoing assessment; progress interventions to tolerance         Timed:         Manual Therapy:    10     mins  77537;     Therapeutic Exercise:    20     mins  83550;     Neuromuscular Miguel A:    10    mins  44453;    Therapeutic Activity:     5     mins  82134;     Gait Training:           mins  52181;     Ultrasound:          mins  42595;    Ionto                                   mins   12637  Self  Care                            mins   29877  Canalith Repos         mins 97784    Un-Timed:  Electrical Stimulation:         mins  63189 ( );  Dry Needling     *     mins self-pay  Traction          mins 45900  Low Eval          Mins  53901  Mod Eval          Mins  84661  High Eval                            Mins  41249      Timed Treatment:   45   mins   Total Treatment:     45   mins      PT: Chandana Pace PT     License Number: [id number]  Electronically signed by Chandana Pace PT, 06/01/22, 1:03 PM EDT

## 2022-06-02 ENCOUNTER — TREATMENT (OUTPATIENT)
Dept: PHYSICAL THERAPY | Facility: CLINIC | Age: 47
End: 2022-06-02

## 2022-06-02 ENCOUNTER — TELEPHONE (OUTPATIENT)
Dept: ENDOCRINOLOGY | Facility: CLINIC | Age: 47
End: 2022-06-02

## 2022-06-02 DIAGNOSIS — M25.512 CHRONIC LEFT SHOULDER PAIN: Primary | ICD-10-CM

## 2022-06-02 DIAGNOSIS — E10.65 TYPE 1 DIABETES MELLITUS WITH HYPERGLYCEMIA: Primary | ICD-10-CM

## 2022-06-02 DIAGNOSIS — M54.6 PAIN IN THORACIC SPINE: ICD-10-CM

## 2022-06-02 DIAGNOSIS — G89.29 CHRONIC LEFT SHOULDER PAIN: Primary | ICD-10-CM

## 2022-06-02 DIAGNOSIS — M54.2 CERVICALGIA: ICD-10-CM

## 2022-06-02 PROCEDURE — 97530 THERAPEUTIC ACTIVITIES: CPT | Performed by: PHYSICAL THERAPIST

## 2022-06-02 PROCEDURE — 97112 NEUROMUSCULAR REEDUCATION: CPT | Performed by: PHYSICAL THERAPIST

## 2022-06-02 PROCEDURE — 97110 THERAPEUTIC EXERCISES: CPT | Performed by: PHYSICAL THERAPIST

## 2022-06-02 RX ORDER — PROCHLORPERAZINE 25 MG/1
SUPPOSITORY RECTAL
COMMUNITY
End: 2022-06-02 | Stop reason: SDUPTHER

## 2022-06-02 RX ORDER — PROCHLORPERAZINE 25 MG/1
1 SUPPOSITORY RECTAL CONTINUOUS
Qty: 1 EACH | Refills: 0 | Status: SHIPPED | OUTPATIENT
Start: 2022-06-02 | End: 2022-06-02 | Stop reason: SDUPTHER

## 2022-06-02 RX ORDER — PROCHLORPERAZINE 25 MG/1
1 SUPPOSITORY RECTAL CONTINUOUS
Qty: 1 EACH | Refills: 3 | Status: SHIPPED | OUTPATIENT
Start: 2022-06-02 | End: 2022-06-02 | Stop reason: SDUPTHER

## 2022-06-02 RX ORDER — PROCHLORPERAZINE 25 MG/1
SUPPOSITORY RECTAL
Qty: 9 EACH | Refills: 3 | Status: SHIPPED | OUTPATIENT
Start: 2022-06-02 | End: 2022-06-02 | Stop reason: SDUPTHER

## 2022-06-02 RX ORDER — PROCHLORPERAZINE 25 MG/1
SUPPOSITORY RECTAL
Qty: 9 EACH | Refills: 3 | Status: SHIPPED | OUTPATIENT
Start: 2022-06-02

## 2022-06-02 RX ORDER — PROCHLORPERAZINE 25 MG/1
1 SUPPOSITORY RECTAL CONTINUOUS
Qty: 1 EACH | Refills: 3 | Status: SHIPPED | OUTPATIENT
Start: 2022-06-02

## 2022-06-02 NOTE — PROGRESS NOTES
Physical Therapy Daily Progress Note    Patient: Luis Wilcox   : 1975  Diagnosis/ICD-10 Code:  Chronic left shoulder pain [M25.512, G89.29]  Referring practitioner: Jarad Valente *  Date of Initial Visit: Type: THERAPY  Noted: 2022  Today's Date: 2022  Patient seen for 5 session       Visit Diagnoses:    ICD-10-CM ICD-9-CM   1. Chronic left shoulder pain  M25.512 719.41    G89.29 338.29   2. Cervicalgia  M54.2 723.1   3. Pain in thoracic spine  M54.6 724.1            Subjective  uLis Wilcox reported today that she's doing better    Objective   No functional measures updated at today's visit unless otherwise stated. For functional assessment and documentation of patient progressions referred to the assessment section.    See Exercise, Manual, and Modality Logs for complete treatments.       Assessment/Plan  Tx today continued with emphasis of progressive functional strengthening and end range mobility to address current limitations and impairments in mobility and function strength/stability of involved areas. Pt appears to be progressing well with current treatment plan; appears to be progressing well with functional strength and mobility, per gross assessment and patient report. Pt continues to have limitations in the above mentioned areas, but is progressing well towards current goals and will continue to benefit from skilled PT to help pt regain functional mobility and strength necessary to reduce symptoms and return to PLOF.      Continue with current treatment plan and ongoing assessment; progress interventions to tolerance         Timed:         Manual Therapy:    -     mins  74521;     Therapeutic Exercise:    20     mins  08444;     Neuromuscular Miguel A:    10    mins  07516;    Therapeutic Activity:     10     mins  83265;     Gait Training:           mins  21565;     Ultrasound:          mins  69044;    Ionto                                   mins   81327  Self Care                             mins   79282  Canalith Repos         mins 67776    Un-Timed:  Electrical Stimulation:         mins  89345 ( );  Dry Needling     -     mins self-pay  Traction          mins 03712  Low Eval          Mins  40324  Mod Eval          Mins  77488  High Eval                            Mins  50244      Timed Treatment:   40   mins   Total Treatment:     40   mins      PT: Chandana Pace PT     License Number: [id number]  Electronically signed by Chandana Pace PT, 06/02/22, 2:10 PM EDT

## 2022-06-02 NOTE — TELEPHONE ENCOUNTER
PA on Dexcom Transmitter done in [facility].     PA Case: [id number], Status: Approved, Coverage Starts on: 6/2/2022 12:00:00 AM, Coverage Ends on: 6/2/2023 12:00:00 AM

## 2022-06-06 ENCOUNTER — TELEPHONE (OUTPATIENT)
Dept: ENDOCRINOLOGY | Facility: CLINIC | Age: 47
End: 2022-06-06

## 2022-06-06 ENCOUNTER — TREATMENT (OUTPATIENT)
Dept: PHYSICAL THERAPY | Facility: CLINIC | Age: 47
End: 2022-06-06

## 2022-06-06 DIAGNOSIS — G89.29 CHRONIC LEFT SHOULDER PAIN: Primary | ICD-10-CM

## 2022-06-06 DIAGNOSIS — M25.512 CHRONIC LEFT SHOULDER PAIN: Primary | ICD-10-CM

## 2022-06-06 DIAGNOSIS — M54.2 CERVICALGIA: ICD-10-CM

## 2022-06-06 DIAGNOSIS — M54.6 PAIN IN THORACIC SPINE: ICD-10-CM

## 2022-06-06 PROCEDURE — 97110 THERAPEUTIC EXERCISES: CPT | Performed by: PHYSICAL THERAPIST

## 2022-06-06 PROCEDURE — 97530 THERAPEUTIC ACTIVITIES: CPT | Performed by: PHYSICAL THERAPIST

## 2022-06-06 PROCEDURE — 97112 NEUROMUSCULAR REEDUCATION: CPT | Performed by: PHYSICAL THERAPIST

## 2022-06-06 NOTE — TELEPHONE ENCOUNTER
Received VM from someone at [facility] stating the received order for Dexcom Transmitter but there is a note on it that patient doesn't need it and they are asking us to call them at [phone] option 2 and let them know whether or not to fill it. They said to reference order number [id number]. I called patient and left this information on her VM with [facility] let [facility] know whether or not to fill this prescription.

## 2022-06-07 NOTE — PROGRESS NOTES
Physical Therapy Daily Progress Note    Patient: Luis Wilcox   : 1975  Diagnosis/ICD-10 Code:  Chronic left shoulder pain [M25.512, G89.29]  Referring practitioner: Jarad Valente *  Date of Initial Visit: Type: THERAPY  Noted: 2022  Today's Date: 2022  Patient seen for 6 session       Visit Diagnoses:    ICD-10-CM ICD-9-CM   1. Chronic left shoulder pain  M25.512 719.41    G89.29 338.29   2. Cervicalgia  M54.2 723.1   3. Pain in thoracic spine  M54.6 724.1            Subjective  Luis Wilcox reported today that she's doing a little better    Objective   No functional measures updated at today's visit unless otherwise stated. For functional assessment and documentation of patient progressions referred to the assessment section.    See Exercise, Manual, and Modality Logs for complete treatments.       Assessment/Plan  Pt's limitations and impairments were addressed in treatment today with progressive functional strengthening, which pt tolerated well and without complaint. VC was provided during interventions to promote proper form and strengthening of target areas; pt appears to be progressing well with functional mobility, per gross assessment and pt report. Pt continues to have limitations in the aforementioned areas, and will continue to benefit from ongoing skilled PT to help pt regain functional mobility and strength to meet LTG and return to PLOF.      Continue with current treatment plan and ongoing assessment; progress interventions to tolerance         Timed:         Manual Therapy:    -     mins  65158;     Therapeutic Exercise:    25     mins  61579;     Neuromuscular Miguel A:    10    mins  82254;    Therapeutic Activity:     20     mins  71677;     Gait Training:           mins  59639;     Ultrasound:          mins  11891;    Ionto                                   mins   18491  Self Care                            mins   45689  Canalith Repos         mins  57347    Un-Timed:  Electrical Stimulation:         mins  93976 ( );  Dry Needling     -     mins self-pay  Traction          mins 63130  Low Eval          Mins  60408  Mod Eval          Mins  61803  High Eval                            Mins  19922      Timed Treatment:   55   mins   Total Treatment:     55   mins      PT: Chandana Pace PT     License Number: [id number]  Electronically signed by Chandana Pace PT, 06/07/22, 9:08 AM EDT

## 2022-06-08 ENCOUNTER — TELEPHONE (OUTPATIENT)
Dept: ENDOCRINOLOGY | Facility: CLINIC | Age: 47
End: 2022-06-08

## 2022-06-08 RX ORDER — INSULIN PMP CART,AUT,G6/7,CNTR
EACH SUBCUTANEOUS
COMMUNITY
End: 2022-06-08 | Stop reason: SDUPTHER

## 2022-06-08 RX ORDER — INSULIN PMP CART,AUT,G6/7,CNTR
1 EACH SUBCUTANEOUS
Qty: 1 KIT | Refills: 0 | Status: SHIPPED | OUTPATIENT
Start: 2022-06-08

## 2022-06-08 RX ORDER — INSULIN PMP CART,AUT,G6/7,CNTR
1 EACH SUBCUTANEOUS
Qty: 10 EACH | Refills: 12 | Status: SHIPPED | OUTPATIENT
Start: 2022-06-08 | End: 2022-06-20 | Stop reason: SDUPTHER

## 2022-06-09 ENCOUNTER — TREATMENT (OUTPATIENT)
Dept: PHYSICAL THERAPY | Facility: CLINIC | Age: 47
End: 2022-06-09

## 2022-06-09 ENCOUNTER — TELEPHONE (OUTPATIENT)
Dept: ENDOCRINOLOGY | Facility: CLINIC | Age: 47
End: 2022-06-09

## 2022-06-09 DIAGNOSIS — M54.6 PAIN IN THORACIC SPINE: ICD-10-CM

## 2022-06-09 DIAGNOSIS — M25.512 CHRONIC LEFT SHOULDER PAIN: Primary | ICD-10-CM

## 2022-06-09 DIAGNOSIS — G89.29 CHRONIC LEFT SHOULDER PAIN: Primary | ICD-10-CM

## 2022-06-09 DIAGNOSIS — M54.2 CERVICALGIA: ICD-10-CM

## 2022-06-09 PROCEDURE — 97110 THERAPEUTIC EXERCISES: CPT | Performed by: PHYSICAL THERAPIST

## 2022-06-09 PROCEDURE — 97112 NEUROMUSCULAR REEDUCATION: CPT | Performed by: PHYSICAL THERAPIST

## 2022-06-09 PROCEDURE — 97530 THERAPEUTIC ACTIVITIES: CPT | Performed by: PHYSICAL THERAPIST

## 2022-06-09 NOTE — TELEPHONE ENCOUNTER
"Initiated PA via CoverMyMeds and recv'd response    \"The member recently filled this medication and will be able to return for their next refill according to their plan limits\"  "

## 2022-06-10 NOTE — PROGRESS NOTES
Physical Therapy Daily Progress Note    Patient: Luis Wilcox   : 1975  Diagnosis/ICD-10 Code:  Chronic left shoulder pain [M25.512, G89.29]  Referring practitioner: Jarad Valente *  Date of Initial Visit: Type: THERAPY  Noted: 2022  Today's Date: 6/10/2022  Patient seen for 7 session       Visit Diagnoses:    ICD-10-CM ICD-9-CM   1. Chronic left shoulder pain  M25.512 719.41    G89.29 338.29   2. Cervicalgia  M54.2 723.1   3. Pain in thoracic spine  M54.6 724.1            Subjective  Luis Wilcox reported today that she's doing a little better    Objective   No functional measures updated at today's visit unless otherwise stated. For functional assessment and documentation of patient progressions referred to the assessment section.    See Exercise, Manual, and Modality Logs for complete treatments.       Assessment/Plan  Pt's limitations and impairments were addressed in treatment today with progressive functional strengthening, which pt tolerated well and without complaint. VC was provided during interventions to promote proper form and strengthening of target areas; pt appears to be progressing well with functional mobility, per gross assessment and pt report. Pt continues to have limitations in the aforementioned areas, and will continue to benefit from ongoing skilled PT to help pt regain functional mobility and strength to meet LTG and return to PLOF.      Continue with current treatment plan and ongoing assessment; progress interventions to tolerance         Timed:         Manual Therapy:    -     mins  53291;     Therapeutic Exercise:    30     mins  68143;     Neuromuscular Miguel A:    15    mins  39097;    Therapeutic Activity:     10     mins  49479;     Gait Training:           mins  31617;     Ultrasound:          mins  11249;    Ionto                                   mins   62250  Self Care                            mins   56068  Canalith Repos         mins  28575    Un-Timed:  Electrical Stimulation:         mins  74086 ( );  Dry Needling     -     mins self-pay  Traction          mins 21499  Low Eval          Mins  22820  Mod Eval          Mins  22991  High Eval                            Mins  45872      Timed Treatment:   55   mins   Total Treatment:     55   mins      PT: Chandana Pace PT     License Number: [id number]  Electronically signed by Chandana Pace PT, 06/10/22, 1:58 PM EDT

## 2022-06-15 NOTE — TELEPHONE ENCOUNTER
Email from pt scanned into phone note. Educator was able to call [facility] to answer clinical questions.

## 2022-06-16 ENCOUNTER — TREATMENT (OUTPATIENT)
Dept: PHYSICAL THERAPY | Facility: CLINIC | Age: 47
End: 2022-06-16

## 2022-06-16 DIAGNOSIS — G89.29 CHRONIC LEFT SHOULDER PAIN: Primary | ICD-10-CM

## 2022-06-16 DIAGNOSIS — M25.512 CHRONIC LEFT SHOULDER PAIN: Primary | ICD-10-CM

## 2022-06-16 DIAGNOSIS — M54.2 CERVICALGIA: ICD-10-CM

## 2022-06-16 DIAGNOSIS — M54.6 PAIN IN THORACIC SPINE: ICD-10-CM

## 2022-06-16 PROCEDURE — 97110 THERAPEUTIC EXERCISES: CPT | Performed by: PHYSICAL THERAPIST

## 2022-06-16 PROCEDURE — 97112 NEUROMUSCULAR REEDUCATION: CPT | Performed by: PHYSICAL THERAPIST

## 2022-06-16 PROCEDURE — 97530 THERAPEUTIC ACTIVITIES: CPT | Performed by: PHYSICAL THERAPIST

## 2022-06-17 NOTE — PROGRESS NOTES
Physical Therapy Daily Progress Note    Patient: Luis Wilcox   : 1975  Diagnosis/ICD-10 Code:  Chronic left shoulder pain [M25.512, G89.29]  Referring practitioner: Jarad Valente *  Date of Initial Visit: Type: THERAPY  Noted: 2022  Today's Date: 2022  Patient seen for 8 session       Visit Diagnoses:    ICD-10-CM ICD-9-CM   1. Chronic left shoulder pain  M25.512 719.41    G89.29 338.29   2. Cervicalgia  M54.2 723.1   3. Pain in thoracic spine  M54.6 724.1            Subjective  Luis Wilcox reported today that she's been a little achy over the past week or so, shoulder keeper her up at night some nights    Objective   No functional measures updated at today's visit unless otherwise stated. For functional assessment and documentation of patient progressions referred to the assessment section.    See Exercise, Manual, and Modality Logs for complete treatments.       Assessment/Plan  Started treatment today with dynamic warm-up to prepare pt for interventions; tx today continued with progressive functional strengthening and end range mobility, which pt tolerated well and without complaint. Verbal and tactile cueing were utilized when necessary to help pt with proper form and motor control. Pt is progressing well with current tx plan and continues to report improvements with ongoing therapy. HEP was reviewed and progressed today to patient's tolerance. Pt continues to have complaints in the aforementioned area, and continues to display limitations and impairments previously noted; pt will continue to benefit from ongoing skilled PT to help pt continue to progress towards current LTGs and return to PLOF.      Continue with current treatment plan and ongoing assessment; progress interventions to tolerance         Timed:         Manual Therapy:    -     mins  30778;     Therapeutic Exercise:    25     mins  46159;     Neuromuscular Miguel A:    15    mins  73634;    Therapeutic Activity:      15     mins  72677;     Gait Training:           mins  43101;     Ultrasound:          mins  57828;    Ionto                                   mins   31533  Self Care                            mins   87796  Canalith Repos         mins 77368    Un-Timed:  Electrical Stimulation:         mins  24658 ( );  Dry Needling     -     mins self-pay  Traction          mins 01930  Low Eval          Mins  14168  Mod Eval          Mins  22870  High Eval                            Mins  83222      Timed Treatment:   55   mins   Total Treatment:     55   mins      PT: Chandana Pace PT     License Number: [id number]  Electronically signed by Chandana Pace PT, 06/17/22, 6:50 AM EDT

## 2022-06-20 ENCOUNTER — OFFICE VISIT (OUTPATIENT)
Dept: ENDOCRINOLOGY | Facility: CLINIC | Age: 47
End: 2022-06-20

## 2022-06-20 ENCOUNTER — TELEPHONE (OUTPATIENT)
Dept: ENDOCRINOLOGY | Facility: CLINIC | Age: 47
End: 2022-06-20

## 2022-06-20 DIAGNOSIS — E10.65 TYPE 1 DIABETES MELLITUS WITH HYPERGLYCEMIA: ICD-10-CM

## 2022-06-20 RX ORDER — INSULIN PMP CART,AUT,G6/7,CNTR
1 EACH SUBCUTANEOUS
Qty: 30 EACH | Refills: 4 | Status: SHIPPED | OUTPATIENT
Start: 2022-06-20

## 2022-06-28 ENCOUNTER — OFFICE VISIT (OUTPATIENT)
Dept: SPORTS MEDICINE | Facility: CLINIC | Age: 47
End: 2022-06-28

## 2022-06-28 VITALS
SYSTOLIC BLOOD PRESSURE: 128 MMHG | BODY MASS INDEX: 30.37 KG/M2 | WEIGHT: 189 LBS | DIASTOLIC BLOOD PRESSURE: 80 MMHG | RESPIRATION RATE: 16 BRPM | HEIGHT: 66 IN | OXYGEN SATURATION: 99 % | TEMPERATURE: 97.8 F | HEART RATE: 78 BPM

## 2022-06-28 DIAGNOSIS — M25.512 LEFT SHOULDER PAIN, UNSPECIFIED CHRONICITY: ICD-10-CM

## 2022-06-28 DIAGNOSIS — M75.02 ADHESIVE CAPSULITIS OF LEFT SHOULDER: Primary | ICD-10-CM

## 2022-06-28 PROCEDURE — 99213 OFFICE O/P EST LOW 20 MIN: CPT | Performed by: FAMILY MEDICINE

## 2022-06-28 RX ORDER — PROCHLORPERAZINE 25 MG/1
SUPPOSITORY RECTAL
COMMUNITY
Start: 2022-06-02 | End: 2022-11-14

## 2022-06-28 NOTE — PROGRESS NOTES
"Luis is a 47 y.o. year old female presents to [facility]    Chief Complaint   Patient presents with   • Shoulder Pain     F/u for LT shoulder pain with adhesive capsulitis - has completed formal PT - here for f/u eval        History of Present Illness  Presents for follow up on left shoulder pain that is present due to adhesive capsulitis. Since her last visit she has had several sessions of formal PT that she believes has been beneficial. She cites improved function and ROM, but not back to baseline. She believes she can continue the at home exercises to improve further.     She is a known diabetic with recent A1C of 10.7    I have reviewed the patient's medical, family, and social history in detail and updated the computerized patient record.    /80 (BP Location: Left arm, Patient Position: Sitting, Cuff Size: Adult)   Pulse 78   Temp 97.8 °F (36.6 °C) (Temporal)   Resp 16   Ht 167.6 cm (65.98\")   Wt 85.7 kg (189 lb)   SpO2 99%   Breastfeeding No   BMI 30.52 kg/m²      Physical Exam    Mask worn thru encounter  Vital signs reviewed.   General: No acute distress.  Eyes: conjunctiva clear; pupils equally round and reactive  ENT: external ears atraumatic  CV: no peripheral edema  Resp: normal respiratory effort, no use of accessory muscles  Skin: no rashes or wounds; normal turgor  Psych: mood and affect appropriate; recent and remote memory intact  Neuro: sensation to light touch intact    MSK Exam  Left shoulder:     Active motion:  Forward flexion 120  Abduction 100    Passive:   Forward flexion 140   Abduction 110  ER 70  IR 30    Negative Santamaria           Diagnoses and all orders for this visit:    Adhesive capsulitis of left shoulder    Left shoulder pain, unspecified chronicity    Other orders  -     Continuous Blood Gluc  (Dexcom G6 ) device        Plan: No corticosteroid injection due to poorly managed diabetes. Continue at home PT program.  " Did discuss possibility of injection if her glucose control is better.  Did also discuss possibility of SHALINI.        Follow Up   No follow-ups on file.  Patient was given instructions and counseling regarding her condition or for health maintenance advice. Please see specific information pulled into the AVS if appropriate.     EMR Dragon/Transcription disclaimer:    Much of this encounter note is an electronic transcription/translation of spoken language to printed text.  The electronic translation of spoken language may permit erroneous, or at times, nonsensical words or phrases to be inadvertently transcribed.  Although I have reviewed the note for such errors some may still exist.

## 2022-07-07 RX ORDER — NORGESTIMATE AND ETHINYL ESTRADIOL 7DAYSX3 28
KIT ORAL
Qty: 84 TABLET | Refills: 0 | Status: SHIPPED | OUTPATIENT
Start: 2022-07-07 | End: 2022-08-26 | Stop reason: SDUPTHER

## 2022-07-18 ENCOUNTER — OFFICE VISIT (OUTPATIENT)
Dept: ENDOCRINOLOGY | Facility: CLINIC | Age: 47
End: 2022-07-18

## 2022-07-18 DIAGNOSIS — E10.65 TYPE 1 DIABETES MELLITUS WITH HYPERGLYCEMIA: ICD-10-CM

## 2022-07-18 PROCEDURE — G0108 DIAB MANAGE TRN  PER INDIV: HCPCS | Performed by: DIETITIAN, REGISTERED

## 2022-07-18 NOTE — PROGRESS NOTES
Spent 30 mins with pt for Omnipod 5 training f/u. Glooko reports scanned into visit. Pt stated that the first week of pod wear, she had very good BG's. However, in the following weeks she has been experiencing frequent fluctuations between both high and low BGs. Per MD s/o, changed Target BG in pump to 120 and to correct over 130. Pt is going to start keeping notes and will email educator on Friday to review [employer] reports again.

## 2022-08-12 ENCOUNTER — TELEPHONE (OUTPATIENT)
Dept: ENDOCRINOLOGY | Facility: CLINIC | Age: 47
End: 2022-08-12

## 2022-08-26 ENCOUNTER — TELEPHONE (OUTPATIENT)
Dept: ENDOCRINOLOGY | Facility: CLINIC | Age: 47
End: 2022-08-26

## 2022-08-28 RX ORDER — NORGESTIMATE AND ETHINYL ESTRADIOL 7DAYSX3 28
KIT ORAL
Qty: 84 TABLET | Refills: 0 | OUTPATIENT
Start: 2022-08-28

## 2022-08-28 RX ORDER — NORGESTIMATE AND ETHINYL ESTRADIOL 7DAYSX3 28
1 KIT ORAL DAILY
Qty: 84 TABLET | Refills: 0 | Status: SHIPPED | OUTPATIENT
Start: 2022-08-28 | End: 2022-11-09 | Stop reason: SDUPTHER

## 2022-09-06 RX ORDER — ROSUVASTATIN CALCIUM 20 MG/1
20 TABLET, COATED ORAL DAILY
Qty: 90 TABLET | Refills: 3 | Status: SHIPPED | OUTPATIENT
Start: 2022-09-06

## 2022-09-29 DIAGNOSIS — E10.65 TYPE 1 DIABETES MELLITUS WITH HYPERGLYCEMIA: ICD-10-CM

## 2022-09-29 RX ORDER — INSULIN LISPRO 100 [IU]/ML
INJECTION, SOLUTION INTRAVENOUS; SUBCUTANEOUS
Qty: 60 ML | Refills: 3 | Status: SHIPPED | OUTPATIENT
Start: 2022-09-29

## 2022-10-06 ENCOUNTER — TELEPHONE (OUTPATIENT)
Dept: ENDOCRINOLOGY | Facility: CLINIC | Age: 47
End: 2022-10-06

## 2022-11-08 ENCOUNTER — TELEPHONE (OUTPATIENT)
Dept: ENDOCRINOLOGY | Facility: CLINIC | Age: 47
End: 2022-11-08

## 2022-11-08 LAB
25(OH)D3+25(OH)D2 SERPL-MCNC: 33.7 NG/ML (ref 30–100)
CHOLEST SERPL-MCNC: 211 MG/DL (ref 100–199)
CREAT UR-MCNC: 110.8 MG/DL
HBA1C MFR BLD: 8.3 % (ref 4.8–5.6)
HDLC SERPL-MCNC: 46 MG/DL
LDLC SERPL CALC-MCNC: 140 MG/DL (ref 0–99)
PROT UR-MCNC: 11.3 MG/DL
PROT/CREAT UR: 102 MG/G CREAT (ref 0–200)
TRIGL SERPL-MCNC: 138 MG/DL (ref 0–149)
TSH SERPL DL<=0.005 MIU/L-ACNC: 3.75 UIU/ML (ref 0.45–4.5)
VLDLC SERPL CALC-MCNC: 25 MG/DL (ref 5–40)

## 2022-11-09 ENCOUNTER — OFFICE VISIT (OUTPATIENT)
Dept: OBSTETRICS AND GYNECOLOGY | Facility: CLINIC | Age: 47
End: 2022-11-09

## 2022-11-09 VITALS
SYSTOLIC BLOOD PRESSURE: 144 MMHG | BODY MASS INDEX: 32.14 KG/M2 | DIASTOLIC BLOOD PRESSURE: 88 MMHG | HEIGHT: 66 IN | WEIGHT: 200 LBS

## 2022-11-09 DIAGNOSIS — Z30.41 ORAL CONTRACEPTIVE USE: ICD-10-CM

## 2022-11-09 DIAGNOSIS — Z12.31 ENCOUNTER FOR SCREENING MAMMOGRAM FOR MALIGNANT NEOPLASM OF BREAST: ICD-10-CM

## 2022-11-09 DIAGNOSIS — Z12.11 SCREENING FOR COLON CANCER: ICD-10-CM

## 2022-11-09 DIAGNOSIS — Z01.419 ROUTINE GYNECOLOGICAL EXAMINATION: Primary | ICD-10-CM

## 2022-11-09 DIAGNOSIS — B00.9 HSV-2 INFECTION: ICD-10-CM

## 2022-11-09 LAB
B-HCG UR QL: NEGATIVE
BILIRUB BLD-MCNC: NEGATIVE MG/DL
CLARITY, POC: CLEAR
COLOR UR: YELLOW
EXPIRATION DATE: NORMAL
GLUCOSE UR STRIP-MCNC: NEGATIVE MG/DL
INTERNAL NEGATIVE CONTROL: NORMAL
INTERNAL POSITIVE CONTROL: NORMAL
KETONES UR QL: NEGATIVE
LEUKOCYTE EST, POC: NEGATIVE
Lab: NORMAL
NITRITE UR-MCNC: NEGATIVE MG/ML
PH UR: 5 [PH] (ref 5–8)
PROT UR STRIP-MCNC: NEGATIVE MG/DL
RBC # UR STRIP: NEGATIVE /UL
SP GR UR: 1 (ref 1–1.03)
UROBILINOGEN UR QL: NORMAL

## 2022-11-09 PROCEDURE — 81025 URINE PREGNANCY TEST: CPT | Performed by: OBSTETRICS & GYNECOLOGY

## 2022-11-09 PROCEDURE — 99396 PREV VISIT EST AGE 40-64: CPT | Performed by: OBSTETRICS & GYNECOLOGY

## 2022-11-09 PROCEDURE — 81002 URINALYSIS NONAUTO W/O SCOPE: CPT | Performed by: OBSTETRICS & GYNECOLOGY

## 2022-11-09 RX ORDER — VALACYCLOVIR HYDROCHLORIDE 500 MG/1
500 TABLET, FILM COATED ORAL DAILY
Qty: 90 TABLET | Refills: 0 | Status: SHIPPED | OUTPATIENT
Start: 2022-11-09

## 2022-11-09 RX ORDER — NORGESTIMATE AND ETHINYL ESTRADIOL 7DAYSX3 28
1 KIT ORAL DAILY
Qty: 84 TABLET | Refills: 3 | Status: SHIPPED | OUTPATIENT
Start: 2022-11-09 | End: 2022-12-21

## 2022-11-09 NOTE — PROGRESS NOTES
GYN Annual Exam     CC- Here for annual exam.     Luis Wilcox is a 47 y.o. female established patient who presents for annual well woman exam. She is on OCPS and her cycles are every 28-30 days and last 3 days. She needs Valtrex refilled. She still does not want her R breast mass removed.  Her DM has been hard to control.     OB History        2    Para   2    Term   2            AB        Living   2       SAB        IAB        Ectopic        Molar        Multiple        Live Births              Obstetric Comments   2              Menarche: 13  Current contraception: OCP (estrogen/progesterone)  History of abnormal Pap smear: yes -  1 abnormal with normal followup  History of abnormal mammogram: yes - s/p B9 breast bx  Family history of uterine, colon or ovarian cancer: no  Family history of breast cancer: no  H/o STDs: HSV 2  Gardasil: none  Last pap: 2021- normal pap/ neg HPV  Gardasil:missed  CHUNG: none    Health Maintenance   Topic Date Due   • Hepatitis B (1 of 3 - 3-dose series) Never done   • COLORECTAL CANCER SCREENING  Never done   • HEPATITIS C SCREENING  Never done   • DIABETIC FOOT EXAM  Never done   • Pneumococcal Vaccine 0-64 (2 - PCV) 2019   • ANNUAL PHYSICAL  2019   • Annual Gynecologic Pelvic and Breast Exam  2022   • HEMOGLOBIN A1C  2023   • DIABETIC EYE EXAM  2023   • LIPID PANEL  2023   • URINE MICROALBUMIN  2023   • PAP SMEAR  2024   • TDAP/TD VACCINES (2 - Td or Tdap) 2028   • COVID-19 Vaccine  Completed   • INFLUENZA VACCINE  Completed       Past Medical History:   Diagnosis Date   • Abnormal Pap smear of cervix     normal pap + HPV ,  both were normal   • Allergic Outside allergies    Dust & molds indoors   • Diabetes mellitus (HCC)    • Diabetes mellitus type I (HCC)    • Herpes     HSV 2   • HPV (human papilloma virus) infection    • Hyperlipidemia        Past Surgical History:   Procedure Laterality Date    • BREAST BIOPSY     • EAR TUBES     • TONSILLECTOMY  1982   • WISDOM TOOTH EXTRACTION           Current Outpatient Medications:   •  norgestimate-ethinyl estradiol (Tri-Estarylla) 0.18/0.215/0.25 MG-35 MCG per tablet, Take 1 tablet by mouth Daily., Disp: 84 tablet, Rfl: 3  •  valACYclovir (VALTREX) 500 MG tablet, Take 1 tablet by mouth Daily., Disp: 90 tablet, Rfl: 0  •  Continuous Blood Gluc  (Dexcom G6 ) device, , Disp: , Rfl:   •  Continuous Blood Gluc Sensor (Dexcom G6 Sensor), Every 10 (Ten) Days. Pt to use to continuously monitor her blood sugars.  Pt to replace every 10 days, Disp: 9 each, Rfl: 3  •  Continuous Blood Gluc Transmit (Dexcom G6 Transmitter) misc, 1 each Continuous. Pt to use to continuously monitor her blood sugars.  Pt to replace every 90 days, Disp: 1 each, Rfl: 3  •  fluticasone (CUTIVATE) 0.05 % cream, APPLY SPARINGLY TWICE A DAY AS NEEDED TO THE AFFECTED AREA ON CHEST AND UNDER BREASTS, Disp: , Rfl:   •  glucose blood (Accu-Chek Guide) test strip, 1 each by Other route As Needed. Use as instructed, Disp: , Rfl:   •  HumaLOG 100 UNIT/ML injection, INJECT A MAXIMUM DAILY DOSE OF 60 UNITS SUBCUTANEOUSLY VIA INSULIN PUMP, Disp: 60 mL, Rfl: 3  •  Insulin Disposable Pump (Omnipod 5 G6 Intro, Gen 5,) kit, 1 each Every 3 (Three) Days., Disp: 1 kit, Rfl: 0  •  Insulin Disposable Pump (Omnipod 5 G6 Pod, Gen 5,) misc, 1 each Every 3 (Three) Days. Change pod every 3 days, Disp: 30 each, Rfl: 4  •  mupirocin (BACTROBAN) 2 % ointment, APPLY TWICE DAILY X 10DAYS TO AXILLAE,UMBILICUS,GROIN AND PERIANAL AREAS, Disp: , Rfl:   •  rosuvastatin (CRESTOR) 20 MG tablet, Take 1 tablet by mouth Daily., Disp: 90 tablet, Rfl: 3  •  vitamin D (ERGOCALCIFEROL) 1.25 MG (80745 UT) capsule capsule, TAKE 1 CAPSULE BY MOUTH 1 (ONE) TIME PER WEEK., Disp: 12 capsule, Rfl: 3    No Known Allergies    Social History     Tobacco Use   • Smoking status: Never   • Smokeless tobacco: Never   Vaping Use   • Vaping  "Use: Never used   Substance Use Topics   • Alcohol use: Yes     Alcohol/week: 1.0 standard drink     Types: 1 Glasses of wine per week     Comment: Occasionally   • Drug use: No       Family History   Problem Relation Age of Onset   • Hyperlipidemia Father    • Diabetes Paternal Grandfather    • Stroke Paternal Grandfather    • No Known Problems Mother    • No Known Problems Brother    • No Known Problems Daughter    • No Known Problems Daughter    • Breast cancer Neg Hx    • Ovarian cancer Neg Hx    • Colon cancer Neg Hx    • Deep vein thrombosis Neg Hx    • Pulmonary embolism Neg Hx        Review of Systems   Constitutional: Positive for activity change and unexpected weight change (gain). Negative for appetite change, fatigue and fever.   Eyes: Negative for photophobia and visual disturbance.   Respiratory: Negative for cough and shortness of breath.    Cardiovascular: Negative for chest pain and palpitations.   Gastrointestinal: Negative for abdominal distention (bloating.), abdominal pain, constipation, diarrhea and nausea.   Endocrine: Negative for cold intolerance and heat intolerance.   Genitourinary: Negative for dyspareunia, dysuria, menstrual problem, pelvic pain, vaginal bleeding and vaginal discharge.   Musculoskeletal: Negative for back pain.   Skin: Negative for color change and rash.   Neurological: Negative for headaches.   Hematological: Negative for adenopathy. Does not bruise/bleed easily.   Psychiatric/Behavioral: Negative for dysphoric mood. The patient is not nervous/anxious.        /88   Ht 167.6 cm (66\")   Wt 90.7 kg (200 lb)   LMP 10/20/2022 (Approximate)   BMI 32.28 kg/m²     Physical Exam   Constitutional: She is oriented to person, place, and time. She appears well-developed. She does not appear ill.   HENT:   Head: Normocephalic and atraumatic.   Eyes: Conjunctivae are normal. No scleral icterus.   Neck: No thyromegaly present.   Cardiovascular: Normal rate, regular rhythm and " normal heart sounds.   Pulmonary/Chest: Effort normal and breath sounds normal. Right breast exhibits mass. Right breast exhibits no inverted nipple, no nipple discharge, no skin change and no tenderness. Left breast exhibits no inverted nipple, no mass, no nipple discharge, no skin change and no tenderness.       Abdominal: Soft. Normal appearance and bowel sounds are normal. She exhibits no distension and no mass. There is no abdominal tenderness. There is no rebound and no guarding. No hernia.   Genitourinary:    Vulva and rectum normal.      Pelvic exam was performed with patient supine.   There is no rash, tenderness or lesion on the right labia. There is no rash, tenderness or lesion on the left labia. Uterus is not deviated, not enlarged, not fixed and not tender. Cervix exhibits no motion tenderness, no discharge and no friability. Right adnexum displays no mass, no tenderness and no fullness. Left adnexum displays no mass, no tenderness and no fullness.    No vaginal discharge, erythema, tenderness or bleeding.   No erythema, tenderness or bleeding in the vagina.    No foreign body in the vagina.      No signs of injury in the vagina.      Genitourinary Comments: Exam limited by habitus     Neurological: She is alert and oriented to person, place, and time.   Skin: Skin is warm and dry.   Psychiatric: Her behavior is normal. Mood, judgment and thought content normal.   Nursing note and vitals reviewed.         Assessment/Plan    1) GYN HM: normal pap/HPV 11/2021SBE demonstrated and encouraged.  2) STD screening: declines Condoms encouraged.  3) Contraception: OCP (estrogen/progesterone) Discussed with patient correct usage of oral contraceptive pills/patches/rings and what to do for a missed dose.  Patient reminded that condoms are the only form of contraceptive that can also prevent STDs and so use is encouraged with every act of coitus.  We reviewed ACHES warning signs (abdominal pain, chest pain,  headache, eye vision changes or severe leg pain and or swelling).  Patient is encouraged to call for any questions or concerns.  Will only refill 3 months until she has her MMG  4) Family Planning: no plans, encourage folic acid daily  5) Diet and Exercise discussed  6) Smoking Status: No  7) .  MMG- UTD 1/2022, B2. Schedule MMG 1/2023  8) Breast mass- pt had normal bx of area in 2017, declines removal. Mass is unchanged but is large and may obscure other findings.  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   10) Cscope- pt declines, plans Cologuard. Pt aware tests are not equivalent in the detection of colon cancer  11)Parts of this document have been copied or forwarded from her previous visits and have been reviewed, updated and edited as indicated.   12) I saw the patient with a face mask, gloves and eye protection  The patient herself was masked.  Social distancing was observed as appropriate.  13)Follow up prn or 1 year annual        Diagnoses and all orders for this visit:    1. Routine gynecological examination (Primary)  -     POC Pregnancy, Urine  -     POC Urinalysis Dipstick    2. Oral contraceptive use    3. Encounter for screening mammogram for malignant neoplasm of breast  -     Mammo Screening Bilateral With CAD; Future    4. Screening for colon cancer  -     Cologuard - Stool, Per Rectum; Future    5. HSV-2 infection    Other orders  -     valACYclovir (VALTREX) 500 MG tablet; Take 1 tablet by mouth Daily.  Dispense: 90 tablet; Refill: 0  -     norgestimate-ethinyl estradiol (Tri-Estarylla) 0.18/0.215/0.25 MG-35 MCG per tablet; Take 1 tablet by mouth Daily.  Dispense: 84 tablet; Refill: 3          Carol Schreiber MD  11/09/2022    13:02 EST

## 2022-11-14 ENCOUNTER — OFFICE VISIT (OUTPATIENT)
Dept: ENDOCRINOLOGY | Facility: CLINIC | Age: 47
End: 2022-11-14

## 2022-11-14 VITALS
HEIGHT: 66 IN | BODY MASS INDEX: 31.66 KG/M2 | HEART RATE: 80 BPM | SYSTOLIC BLOOD PRESSURE: 128 MMHG | DIASTOLIC BLOOD PRESSURE: 80 MMHG | WEIGHT: 197 LBS

## 2022-11-14 DIAGNOSIS — E78.2 MIXED HYPERLIPIDEMIA: ICD-10-CM

## 2022-11-14 DIAGNOSIS — E10.65 TYPE 1 DIABETES MELLITUS WITH HYPERGLYCEMIA: Primary | ICD-10-CM

## 2022-11-14 DIAGNOSIS — Z96.41 INSULIN PUMP STATUS: ICD-10-CM

## 2022-11-14 PROCEDURE — 99214 OFFICE O/P EST MOD 30 MIN: CPT | Performed by: INTERNAL MEDICINE

## 2022-11-14 NOTE — PATIENT INSTRUCTIONS
Keep up the good work!  Continue exercise.  Continue rosuvastatin.  Decrease basal rate to 1.0 units/h from 9a-8p & to 0.9 units from 8p-12a.  Bolus before eating.  Call if blood sugars are running under 100 or over 200.  F/u in 6 months, with labs prior.

## 2022-11-20 NOTE — PROGRESS NOTES
[facility]        Patient Care Team:  Jarad Valente Jr., DO as PCP - General (Sports Medicine)  Carol Schreiber MD as Consulting Physician (Obstetrics and Gynecology)  Neyda Green MD as Consulting Physician (Endocrinology)    Chief Complaint:    Chief Complaint   Patient presents with   • Diabetes     Type 1, , 3 hr PP, Basal 12am 1.1, 6am 1.1, 9am 1.05, 8pm 1.0         Subjective   Here for diabetes f/u  Blood sugars: in the high 100's  Insulin delivery per pump: Basal 64% / bolus 36%  Exercise program: treadmill 3x/week  Not taking vit D    Interval History:     Patient Complaints: blood sugar dropping w exercise  Patient Denies: severe hypoglycemia  History taken from: patient    Review of Systems:   Review of Systems   Eyes: Negative for blurred vision.   Gastrointestinal: Negative for nausea.   Endocrine: Negative for polyuria.   Neurological: Negative for headache.     Gained 12 lb since last visit    Objective     Vital Signs      Vitals:    11/14/22 1220   BP: 128/80   Pulse: 80         Physical Exam:     General Appearance:    Alert, cooperative, in no acute distress. Obese   Head:    Normocephalic, without obvious abnormality, atraumatic   Eyes:            Lids and lashes normal, conjunctivae and sclerae normal, no   icterus, no pallor, corneas clear, PERRLA   Throat:   No oral lesions,  oral mucosa moist   Neck:   36 cm in circumference, thyroid soft, no carotid bruit   Lungs:     Clear     Heart:    Regular rhythm and normal rate   Chest Wall:    No abnormalities observed   Abdomen:     Normal bowel sounds, soft                 Extremities:   Moves all extremities well, no edema               Pulses:   Pulses palpable and equal bilaterally   Skin:   Pump site clean   Neurologic:  DTR 1+, able to feel the 10g monofilament          Results Review:    I have reviewed the patient's new clinical results, labs & imaging.    Medication Review:   Prior to  Admission medications    Medication Sig Start Date End Date Taking? Authorizing Provider   Continuous Blood Gluc Sensor (Dexcom G6 Sensor) Every 10 (Ten) Days. Pt to use to continuously monitor her blood sugars.  Pt to replace every 10 days 6/2/22  Yes Neyda Green MD   Continuous Blood Gluc Transmit (Dexcom G6 Transmitter) misc 1 each Continuous. Pt to use to continuously monitor her blood sugars.  Pt to replace every 90 days 6/2/22  Yes Neyda Green MD   fluticasone (CUTIVATE) 0.05 % cream APPLY SPARINGLY TWICE A DAY AS NEEDED TO THE AFFECTED AREA ON CHEST AND UNDER BREASTS 7/15/20  Yes Lexie Pham MD   glucose blood (Accu-Chek Guide) test strip 1 each by Other route As Needed. Use as instructed   Yes Lexie Pham MD   HumaLOG 100 UNIT/ML injection INJECT A MAXIMUM DAILY DOSE OF 60 UNITS SUBCUTANEOUSLY VIA INSULIN PUMP 9/29/22  Yes Neyda Green MD   Insulin Disposable Pump (Omnipod 5 G6 Intro, Gen 5,) kit 1 each Every 3 (Three) Days. 6/8/22  Yes Neyda Green MD   Insulin Disposable Pump (Omnipod 5 G6 Pod, Gen 5,) misc 1 each Every 3 (Three) Days. Change pod every 3 days 6/20/22  Yes Neyda Green MD   mupirocin (BACTROBAN) 2 % ointment APPLY TWICE DAILY X 10DAYS TO AXILLAE,UMBILICUS,GROIN AND PERIANAL AREAS 7/21/20  Yes Lexie Pham MD   norgestimate-ethinyl estradiol (Tri-Estarylla) 0.18/0.215/0.25 MG-35 MCG per tablet Take 1 tablet by mouth Daily. 11/9/22  Yes Carol Schreiber MD   rosuvastatin (CRESTOR) 20 MG tablet Take 1 tablet by mouth Daily. 9/6/22  Yes Neyda Green MD   valACYclovir (VALTREX) 500 MG tablet Take 1 tablet by mouth Daily. 11/9/22  Yes Carol Schreiber MD   vitamin D (ERGOCALCIFEROL) 1.25 MG (90576 UT) capsule capsule TAKE 1 CAPSULE BY MOUTH 1 (ONE) TIME PER WEEK. 7/1/21  Yes Neyda Green MD       Lab Results (most recent)     None        Lab Results   Component Value Date    HGBA1C 8.3 (H) 11/07/2022     HGBA1C 10.7 (H) 04/27/2022    HGBA1C 10.7 04/27/2022      Lab Results   Component Value Date    GLUCOSE 301 (H) 10/28/2021    BUN 15 10/28/2021    CREATININE 0.86 10/28/2021    EGFRIFNONA 82 10/28/2021    EGFRIFAFRI 95 10/28/2021    BCR 17 10/28/2021    K 4.6 10/28/2021    CO2 24 10/28/2021    CALCIUM 9.1 10/28/2021    PROTENTOTREF 7.2 10/28/2021    ALBUMIN 3.9 10/28/2021    LABIL2 1.2 10/28/2021    AST 17 10/28/2021    ALT 12 10/28/2021     (H) 11/07/2022    HDL 46 11/07/2022    TRIG 138 11/07/2022     Lab Results   Component Value Date    TSH 3.750 11/07/2022    [id number] 33.7 11/07/2022       Assessment & Plan     Diagnoses and all orders for this visit:    1. Type 1 diabetes mellitus with hyperglycemia (HCC) (Primary) - improved  -     Hemoglobin A1c; Future    2. Mixed hyperlipidemia - stable  -     Comprehensive Metabolic Panel; Future    3. Insulin pump status    Wearing an OmniPod 5 since July 2022.  Pt has agreed to wear the CGM device and share readings on a regular basis with our office.      Continue exercise.  Continue rosuvastatin.  Decrease basal rate to 1.0 units/h from 9a-8p & to 0.9 units from 8p-12a.  Bolus before eating.  Call if blood sugars are running under 100 or over 200.        Neyda Green MD  11/19/22  21:47 EST

## 2022-12-21 RX ORDER — NORGESTIMATE AND ETHINYL ESTRADIOL 7DAYSX3 28
KIT ORAL
Qty: 84 TABLET | Refills: 0 | Status: SHIPPED | OUTPATIENT
Start: 2022-12-21 | End: 2023-03-21

## 2023-01-13 ENCOUNTER — TELEPHONE (OUTPATIENT)
Dept: ENDOCRINOLOGY | Facility: CLINIC | Age: 48
End: 2023-01-13
Payer: COMMERCIAL

## 2023-03-21 RX ORDER — NORGESTIMATE AND ETHINYL ESTRADIOL 7DAYSX3 28
KIT ORAL
Qty: 84 TABLET | Refills: 0 | Status: SHIPPED | OUTPATIENT
Start: 2023-03-21

## 2023-04-21 DIAGNOSIS — E10.65 TYPE 1 DIABETES MELLITUS WITH HYPERGLYCEMIA: ICD-10-CM

## 2023-04-21 RX ORDER — PROCHLORPERAZINE 25 MG/1
SUPPOSITORY RECTAL
Qty: 9 EACH | Refills: 3 | Status: SHIPPED | OUTPATIENT
Start: 2023-04-21

## 2023-04-21 RX ORDER — PROCHLORPERAZINE 25 MG/1
1 SUPPOSITORY RECTAL CONTINUOUS
Qty: 1 EACH | Refills: 3 | Status: SHIPPED | OUTPATIENT
Start: 2023-04-21

## 2023-04-27 DIAGNOSIS — E10.65 TYPE 1 DIABETES MELLITUS WITH HYPERGLYCEMIA: ICD-10-CM

## 2023-04-27 RX ORDER — PROCHLORPERAZINE 25 MG/1
1 SUPPOSITORY RECTAL
Qty: 1 EACH | Refills: 3 | Status: SHIPPED | OUTPATIENT
Start: 2023-04-27

## 2023-04-27 RX ORDER — PROCHLORPERAZINE 25 MG/1
SUPPOSITORY RECTAL
Qty: 9 EACH | Refills: 3 | Status: SHIPPED | OUTPATIENT
Start: 2023-04-27

## 2023-05-22 ENCOUNTER — TELEPHONE (OUTPATIENT)
Dept: ENDOCRINOLOGY | Facility: CLINIC | Age: 48
End: 2023-05-22
Payer: COMMERCIAL

## 2023-05-22 NOTE — TELEPHONE ENCOUNTER
Approved today  PA Case: [id number], Status: Approved, Coverage Starts on: 5/22/2023 12:00:00 AM, Coverage Ends on: 5/21/2024 12:00:00 AM

## 2023-06-07 RX ORDER — INSULIN PMP CART,AUT,G6/7,CNTR
1 EACH SUBCUTANEOUS
Qty: 30 EACH | Refills: 1 | Status: SHIPPED | OUTPATIENT
Start: 2023-06-07

## 2023-06-12 ENCOUNTER — OFFICE VISIT (OUTPATIENT)
Dept: ENDOCRINOLOGY | Facility: CLINIC | Age: 48
End: 2023-06-12
Payer: COMMERCIAL

## 2023-06-12 VITALS
SYSTOLIC BLOOD PRESSURE: 130 MMHG | HEART RATE: 83 BPM | HEIGHT: 66 IN | DIASTOLIC BLOOD PRESSURE: 90 MMHG | BODY MASS INDEX: 32.17 KG/M2 | WEIGHT: 200.2 LBS

## 2023-06-12 DIAGNOSIS — Z96.41 INSULIN PUMP STATUS: ICD-10-CM

## 2023-06-12 DIAGNOSIS — E10.65 TYPE 1 DIABETES MELLITUS WITH HYPERGLYCEMIA: Primary | ICD-10-CM

## 2023-06-12 DIAGNOSIS — E78.2 MIXED HYPERLIPIDEMIA: ICD-10-CM

## 2023-06-12 DIAGNOSIS — E55.9 VITAMIN D DEFICIENCY: ICD-10-CM

## 2023-06-12 PROCEDURE — 99214 OFFICE O/P EST MOD 30 MIN: CPT | Performed by: INTERNAL MEDICINE

## 2023-06-12 NOTE — PATIENT INSTRUCTIONS
Increase exercise as planned.  Decrease basal rate to 0.9 units/h from 12a-6a.  Talk to pump  for pump setting adjustments.  Call if blood sugars are running under 100 or over 200.  F/u in 6 months, with fasting labs prior.

## 2023-06-13 NOTE — PROGRESS NOTES
[facility]        Patient Care Team:  Jarad Valente Jr., DO as PCP - General (Sports Medicine)  Carol Schreiber MD as Consulting Physician (Obstetrics and Gynecology)  Neyda Green MD as Consulting Physician (Endocrinology)    Chief Complaint:    Chief Complaint   Patient presents with    Diabetes     Type 1, , 4 hr PP, Basal 33.4 total         Subjective   Here for diabetes f/u  Blood sugars: low in am  Insulin delivery per pump: Basal 67%/ bolus 33%  Auto mode 88%  Exercise program: not much (sugar drops)  Forgets to take vit D    Interval History:     Patient Complaints:  frustrated w her pump  Patient Denies: severe hypoglycemia  History taken from: patient    Review of Systems:   Review of Systems   Eyes:  Negative for blurred vision.   Gastrointestinal:  Negative for nausea.   Endocrine: Negative for polyuria.   Neurological:  Negative for headache.   Gained 3 lb since last visit    Objective     Vital Signs  Heart Rate:  [83] 83  BP: (130)/(90) 130/90    Physical Exam:     General Appearance:    Alert, cooperative, in no acute distress. Obese   Head:    Normocephalic, without obvious abnormality, atraumatic   Eyes:            Lids and lashes normal, conjunctivae and sclerae normal, no   icterus, no pallor, corneas clear, PERRLA   Throat:   No oral lesions,  oral mucosa moist   Neck:   No adenopathy, supple,  no thyromegaly, no   carotid bruit   Lungs:     Clear    Heart:    Regular rhythm and normal rate   Chest Wall:    No abnormalities observed   Abdomen:     Normal bowel sounds, soft                 Extremities:   Moves all extremities well, no edema               Pulses:   Pulses palpable and equal bilaterally   Skin:   Pump site clean   Neurologic:  DTR 1+ in ankles, vibratory sense 25% decreased in toes able to feel the 10g monofilament ( same as 1y ago )            Results Review:    I have reviewed the patient's new clinical results, labs &  imaging.    Medication Review:   Prior to Admission medications    Medication Sig Start Date End Date Taking? Authorizing Provider   Continuous Blood Gluc Sensor (Dexcom G6 Sensor) Every 10 (Ten) Days. 4/27/23  Yes Neyda Green MD   Continuous Blood Gluc Transmit (Dexcom G6 Transmitter) misc 1 each Every 3 (Three) Months. 4/27/23  Yes Neyda Green MD   fluticasone (CUTIVATE) 0.05 % cream APPLY SPARINGLY TWICE A DAY AS NEEDED TO THE AFFECTED AREA ON CHEST AND UNDER BREASTS 7/15/20  Yes Lexie Pham MD   glucose blood (Accu-Chek Guide) test strip 1 each by Other route As Needed. Use as instructed   Yes Lexie Pham MD   HumaLOG 100 UNIT/ML injection INJECT A MAXIMUM DAILY DOSE OF 60 UNITS SUBCUTANEOUSLY VIA INSULIN PUMP 9/29/22  Yes Neyda Green MD   hydrocortisone 2.5 % cream APPLY TO AFFECTED SITES UNDER BREASTS AS NEEDED FOR ITCHING. STOP UPON IMPROVEMENT. KEEP ON HAND FOR FLARES 2/23/23  Yes Lexie Pham MD   Insulin Disposable Pump (Omnipod 5 G6 Pod, Gen 5,) misc 1 each Every 3 (Three) Days. Change pod every 3 days 6/7/23  Yes Neyda Green MD   mupirocin (BACTROBAN) 2 % ointment APPLY TWICE DAILY X 10DAYS TO AXILLAE,UMBILICUS,GROIN AND PERIANAL AREAS 7/21/20  Yes Lexie Pham MD   rosuvastatin (CRESTOR) 20 MG tablet Take 1 tablet by mouth Daily. 9/6/22  Yes Neyda Green MD   Tri-Estarylla 0.18/0.215/0.25 MG-35 MCG per tablet TAKE 1 TABLET DAILY 3/21/23  Yes Carol Schreiber MD   valACYclovir (VALTREX) 500 MG tablet Take 1 tablet by mouth Daily. 11/9/22  Yes Carol Schreiber MD   vitamin D (ERGOCALCIFEROL) 1.25 MG (85600 UT) capsule capsule TAKE 1 CAPSULE BY MOUTH 1 (ONE) TIME PER WEEK. 7/1/21  Yes Neyda Green MD       Lab Results (most recent)       None          Lab Results   Component Value Date    HGBA1C 8.3 (H) 11/07/2022    HGBA1C 10.7 (H) 04/27/2022    HGBA1C 10.7 04/27/2022      Lab Results   Component Value Date     GLUCOSE 301 (H) 10/28/2021    BUN 15 10/28/2021    CREATININE 0.86 10/28/2021    EGFRIFNONA 82 10/28/2021    EGFRIFAFRI 95 10/28/2021    BCR 17 10/28/2021    K 4.6 10/28/2021    CO2 24 10/28/2021    CALCIUM 9.1 10/28/2021    PROTENTOTREF 7.2 10/28/2021    ALBUMIN 3.9 10/28/2021    LABIL2 1.2 10/28/2021    AST 17 10/28/2021    ALT 12 10/28/2021     (H) 11/07/2022    HDL 46 11/07/2022    TRIG 138 11/07/2022     Lab Results   Component Value Date    TSH 3.750 11/07/2022    [id number] 33.7 11/07/2022     A1C 8.6%; cr 0.81, K 5, ALT 14, gl 166 on 6/6/2023.    Assessment & Plan     Diagnoses and all orders for this visit:    1. Type 1 diabetes mellitus with hyperglycemia (Primary) - worse  -     Hemoglobin A1c; Future  -     Microalbumin / Creatinine Urine Ratio - Urine, Clean Catch; Future    2. Mixed hyperlipidemia - will check status next visit  -     Comprehensive Metabolic Panel; Future  -     Lipid Panel; Future  -     TSH; Future    3. Vitamin D deficiency - will check status next visit  -     Vitamin D,25-Hydroxy; Future    4. Insulin pump status    Wearing an OmniPod 5 since July 2022.  Pt has agreed to wear the CGM device and share readings on a regular basis with our office.    Increase exercise as planned.  Decrease basal rate to 0.9 units/h from 12a-6a.  Talk to pump  for pump setting adjustments.  Call if blood sugars are running under 100 or over 200.        Neyda Green MD  06/12/23  23:23 EDT

## 2023-06-15 DIAGNOSIS — E10.65 TYPE 1 DIABETES MELLITUS WITH HYPERGLYCEMIA: ICD-10-CM

## 2023-06-15 RX ORDER — INSULIN LISPRO 100 [IU]/ML
INJECTION, SOLUTION INTRAVENOUS; SUBCUTANEOUS
Qty: 60 ML | Refills: 3 | Status: SHIPPED | OUTPATIENT
Start: 2023-06-15

## 2023-06-15 RX ORDER — PROCHLORPERAZINE 25 MG/1
SUPPOSITORY RECTAL
Qty: 9 EACH | Refills: 3 | Status: SHIPPED | OUTPATIENT
Start: 2023-06-15

## 2023-06-15 RX ORDER — PROCHLORPERAZINE 25 MG/1
1 SUPPOSITORY RECTAL
Qty: 1 EACH | Refills: 3 | Status: SHIPPED | OUTPATIENT
Start: 2023-06-15

## 2023-06-15 RX ORDER — INSULIN PMP CART,AUT,G6/7,CNTR
1 EACH SUBCUTANEOUS
Qty: 30 EACH | Refills: 3 | Status: SHIPPED | OUTPATIENT
Start: 2023-06-15

## 2023-08-02 RX ORDER — NORGESTIMATE AND ETHINYL ESTRADIOL 7DAYSX3 28
1 KIT ORAL DAILY
Qty: 84 TABLET | Refills: 0 | Status: SHIPPED | OUTPATIENT
Start: 2023-08-02

## 2023-10-20 RX ORDER — NORGESTIMATE AND ETHINYL ESTRADIOL 7DAYSX3 28
1 KIT ORAL DAILY
Qty: 84 TABLET | Refills: 0 | Status: SHIPPED | OUTPATIENT
Start: 2023-10-20

## 2023-11-15 ENCOUNTER — OFFICE VISIT (OUTPATIENT)
Dept: OBSTETRICS AND GYNECOLOGY | Facility: CLINIC | Age: 48
End: 2023-11-15
Payer: COMMERCIAL

## 2023-11-15 VITALS
HEIGHT: 66 IN | DIASTOLIC BLOOD PRESSURE: 92 MMHG | BODY MASS INDEX: 33.4 KG/M2 | WEIGHT: 207.8 LBS | SYSTOLIC BLOOD PRESSURE: 136 MMHG

## 2023-11-15 DIAGNOSIS — Z30.011 VISIT FOR ORAL CONTRACEPTIVE PRESCRIPTION: ICD-10-CM

## 2023-11-15 DIAGNOSIS — Z01.419 ROUTINE GYNECOLOGICAL EXAMINATION: ICD-10-CM

## 2023-11-15 DIAGNOSIS — Z11.51 SPECIAL SCREENING EXAMINATION FOR HUMAN PAPILLOMAVIRUS (HPV): ICD-10-CM

## 2023-11-15 DIAGNOSIS — Z12.31 ENCOUNTER FOR SCREENING MAMMOGRAM FOR MALIGNANT NEOPLASM OF BREAST: ICD-10-CM

## 2023-11-15 DIAGNOSIS — Z01.419 CERVICAL SMEAR, AS PART OF ROUTINE GYNECOLOGICAL EXAMINATION: Primary | ICD-10-CM

## 2023-11-15 RX ORDER — NORGESTIMATE AND ETHINYL ESTRADIOL 7DAYSX3 28
1 KIT ORAL DAILY
Qty: 84 TABLET | Refills: 3 | Status: SHIPPED | OUTPATIENT
Start: 2023-11-15

## 2023-11-15 NOTE — PROGRESS NOTES
GYN Annual Exam     CC- Here for annual exam.     Luis Wilcox is a 48 y.o. female established patient who presents for annual well woman exam. She is on OCPS and her cycles are every 28-30 days and last 3 days. We discussed that she may want to consider changing from OCPS to an IUD if her BP remains elevated.  She declines for now.       OB History          2    Para   2    Term   2            AB        Living   2         SAB        IAB        Ectopic        Molar        Multiple        Live Births              Obstetric Comments   2                Menarche: 13  Current contraception: OCP (estrogen/progesterone)  History of abnormal Pap smear: yes -  1 abnormal with normal followup  History of abnormal mammogram: yes - s/p B9 breast bx  Family history of uterine, colon or ovarian cancer: no  Family history of breast cancer: no  H/o STDs: HSV 2  Gardasil: none  Last pap: 2021- normal pap/ neg HPV  Gardasil:missed  CHUNG: none    Health Maintenance   Topic Date Due    Hepatitis B (1 of 3 - 3-dose series) Never done    HEPATITIS C SCREENING  Never done    Pneumococcal Vaccine 0-64 (2 - PCV) 2019    ANNUAL PHYSICAL  2019    BMI FOLLOWUP  2021    COVID-19 Vaccine (2023-24 season) 2023    LIPID PANEL  2023    URINE MICROALBUMIN  2023    Annual Gynecologic Pelvic and Breast Exam  11/10/2023    DIABETIC FOOT EXAM  2023    HEMOGLOBIN A1C  2023    DIABETIC EYE EXAM  10/06/2024    PAP SMEAR  2024    COLORECTAL CANCER SCREENING  2025    TDAP/TD VACCINES (2 - Td or Tdap) 2028    INFLUENZA VACCINE  Completed       Past Medical History:   Diagnosis Date    Abnormal Pap smear of cervix     normal pap + HPV ,  both were normal    Allergic Outside allergies    Dust & molds indoors    Diabetes mellitus     Diabetes mellitus type I     Herpes     HSV 2    HPV (human papilloma virus) infection     Hyperlipidemia        Past Surgical  History:   Procedure Laterality Date    BASAL CELL CARCINOMA EXCISION Right 04/13/2023    BREAST BIOPSY      EAR TUBES      TONSILLECTOMY  1982    WISDOM TOOTH EXTRACTION           Current Outpatient Medications:     norgestimate-ethinyl estradiol (Tri-Estarylla) 0.18/0.215/0.25 MG-35 MCG per tablet, Take 1 tablet by mouth Daily., Disp: 84 tablet, Rfl: 3    Continuous Blood Gluc Sensor (Dexcom G6 Sensor), Every 10 (Ten) Days., Disp: 9 each, Rfl: 3    Continuous Blood Gluc Transmit (Dexcom G6 Transmitter) misc, 1 each Every 3 (Three) Months., Disp: 1 each, Rfl: 3    fluticasone (CUTIVATE) 0.05 % cream, APPLY SPARINGLY TWICE A DAY AS NEEDED TO THE AFFECTED AREA ON CHEST AND UNDER BREASTS, Disp: , Rfl:     glucose blood (Accu-Chek Guide) test strip, 1 each by Other route As Needed. Use as instructed, Disp: , Rfl:     HumaLOG 100 UNIT/ML injection, INJECT A MAXIMUM DAILY DOSE OF 60 UNITS SUBCUTANEOUSLY VIA INSULIN PUMP, Disp: 60 mL, Rfl: 3    hydrocortisone 2.5 % cream, APPLY TO AFFECTED SITES UNDER BREASTS AS NEEDED FOR ITCHING. STOP UPON IMPROVEMENT. KEEP ON HAND FOR FLARES, Disp: , Rfl:     Insulin Disposable Pump (Omnipod 5 G6 Pod, Gen 5,) misc, 1 each Every 3 (Three) Days. Change pod every 3 days, Disp: 30 each, Rfl: 3    mupirocin (BACTROBAN) 2 % ointment, APPLY TWICE DAILY X 10DAYS TO AXILLAE,UMBILICUS,GROIN AND PERIANAL AREAS, Disp: , Rfl:     rosuvastatin (CRESTOR) 20 MG tablet, Take 1 tablet by mouth Daily., Disp: 90 tablet, Rfl: 3    valACYclovir (VALTREX) 500 MG tablet, Take 1 tablet by mouth Daily., Disp: 90 tablet, Rfl: 0    vitamin D (ERGOCALCIFEROL) 1.25 MG (11609 UT) capsule capsule, TAKE 1 CAPSULE BY MOUTH 1 (ONE) TIME PER WEEK., Disp: 12 capsule, Rfl: 3    No Known Allergies    Social History     Tobacco Use    Smoking status: Never    Smokeless tobacco: Never   Vaping Use    Vaping Use: Never used   Substance Use Topics    Alcohol use: Yes     Alcohol/week: 1.0 standard drink of alcohol     Types: 1  "Glasses of wine per week     Comment: Occasionally    Drug use: No       Family History   Problem Relation Age of Onset    Hyperlipidemia Father     Diabetes Paternal Grandfather     Stroke Paternal Grandfather     No Known Problems Mother     No Known Problems Brother     No Known Problems Daughter     No Known Problems Daughter     Breast cancer Neg Hx     Ovarian cancer Neg Hx     Colon cancer Neg Hx     Deep vein thrombosis Neg Hx     Pulmonary embolism Neg Hx        Review of Systems   Constitutional:  Positive for activity change and unexpected weight change (gain). Negative for appetite change, fatigue and fever.   Eyes:  Negative for photophobia and visual disturbance.   Respiratory:  Negative for cough and shortness of breath.    Cardiovascular:  Negative for chest pain and palpitations.   Gastrointestinal:  Negative for abdominal distention (bloating.), abdominal pain, constipation, diarrhea and nausea.   Endocrine: Negative for cold intolerance and heat intolerance.   Genitourinary:  Negative for dyspareunia, dysuria, menstrual problem, pelvic pain, vaginal bleeding and vaginal discharge.   Musculoskeletal:  Negative for back pain.   Skin:  Negative for color change and rash.   Neurological:  Negative for headaches.   Hematological:  Negative for adenopathy. Does not bruise/bleed easily.   Psychiatric/Behavioral:  Negative for dysphoric mood. The patient is not nervous/anxious.        /92   Ht 167.6 cm (66\")   Wt 94.3 kg (207 lb 12.8 oz)   LMP 11/03/2023 (Exact Date)   BMI 33.54 kg/m²     Physical Exam   Constitutional: She is oriented to person, place, and time. She appears well-developed. She does not appear ill.   HENT:   Head: Normocephalic and atraumatic.   Eyes: Conjunctivae are normal. No scleral icterus.   Neck: No thyromegaly present.   Cardiovascular: Normal rate, regular rhythm and normal heart sounds.   Pulmonary/Chest: Effort normal and breath sounds normal. Right breast exhibits " mass. Right breast exhibits no inverted nipple, no nipple discharge, no skin change and no tenderness. Left breast exhibits no inverted nipple, no mass, no nipple discharge, no skin change and no tenderness.       Abdominal: Soft. Normal appearance and bowel sounds are normal. She exhibits no distension and no mass. There is no abdominal tenderness. There is no rebound and no guarding. No hernia.   Genitourinary:    Vulva and rectum normal.      Pelvic exam was performed with patient supine.   There is no rash, tenderness or lesion on the right labia. There is no rash, tenderness or lesion on the left labia. Uterus is not deviated, not enlarged, not fixed and not tender. Cervix exhibits no motion tenderness, no discharge and no friability. Right adnexum displays no mass, no tenderness and no fullness. Left adnexum displays no mass, no tenderness and no fullness.    No vaginal discharge, erythema, tenderness or bleeding.   No erythema, tenderness or bleeding in the vagina.    No foreign body in the vagina.      No signs of injury in the vagina.      Genitourinary Comments: Exam limited by habitus     Neurological: She is alert and oriented to person, place, and time.   Skin: Skin is warm and dry.   Psychiatric: Her behavior is normal. Mood, judgment and thought content normal.   Nursing note and vitals reviewed.         Assessment/Plan    1) GYN HM: normal pap/HPV 11/2021, check pap/HPV. SBE demonstrated and encouraged.  2) STD screening: declines Condoms encouraged.  3) Contraception: OCP (estrogen/progesterone) Discussed with patient correct usage of oral contraceptive pills/patches/rings and what to do for a missed dose.  Patient reminded that condoms are the only form of contraceptive that can also prevent STDs and so use is encouraged with every act of coitus.  We reviewed ACHES warning signs (abdominal pain, chest pain, headache, eye vision changes or severe leg pain and or swelling).  Patient is encouraged to  call for any questions or concerns.    4) Family Planning: no plans, encourage folic acid daily  5) Diet and Exercise discussed  6) Smoking Status: No  7) .  MMG- UTD 1/2023, B2. Schedule MMG 1/2024  8) Breast mass- pt had normal bx of area in 2017, declines removal. Mass is unchanged but is large and may obscure other findings.  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   10) Cscope- pt declines,  Cologuard neg 11/2022.  Repeat in 3 years.  Pt aware tests are not equivalent in the detection of colon cancer  11)Parts of this document have been copied or forwarded from her previous visits and have been reviewed, updated and edited as indicated.   12)Follow up prn or 1 year annual        Diagnoses and all orders for this visit:    1. Cervical smear, as part of routine gynecological examination (Primary)  -     POC Urinalysis Dipstick  -     POC Pregnancy, Urine  -     IGP, Apt HPV,rfx 16 / 18,45    2. Routine gynecological examination  -     POC Urinalysis Dipstick  -     POC Pregnancy, Urine  -     IGP, Apt HPV,rfx 16 / 18,45    3. Special screening examination for human papillomavirus (HPV)  -     POC Urinalysis Dipstick  -     POC Pregnancy, Urine  -     IGP, Apt HPV,rfx 16 / 18,45    4. Encounter for screening mammogram for malignant neoplasm of breast  -     Mammo Screening Digital Tomosynthesis Bilateral With CAD; Future    5. Visit for oral contraceptive prescription    Other orders  -     norgestimate-ethinyl estradiol (Tri-Estarylla) 0.18/0.215/0.25 MG-35 MCG per tablet; Take 1 tablet by mouth Daily.  Dispense: 84 tablet; Refill: 3          Carol Schreiber MD  11/15/2023    20:41 EST

## 2023-11-16 DIAGNOSIS — E55.9 VITAMIN D DEFICIENCY: ICD-10-CM

## 2023-11-21 RX ORDER — ROSUVASTATIN CALCIUM 20 MG/1
20 TABLET, COATED ORAL DAILY
Qty: 90 TABLET | Refills: 3 | Status: SHIPPED | OUTPATIENT
Start: 2023-11-21

## 2023-11-21 RX ORDER — ERGOCALCIFEROL 1.25 MG/1
50000 CAPSULE ORAL WEEKLY
Qty: 12 CAPSULE | Refills: 3 | Status: SHIPPED | OUTPATIENT
Start: 2023-11-21

## 2024-01-29 ENCOUNTER — OFFICE VISIT (OUTPATIENT)
Dept: ENDOCRINOLOGY | Facility: CLINIC | Age: 49
End: 2024-01-29
Payer: COMMERCIAL

## 2024-01-29 VITALS
SYSTOLIC BLOOD PRESSURE: 140 MMHG | BODY MASS INDEX: 33.59 KG/M2 | OXYGEN SATURATION: 95 % | HEIGHT: 66 IN | DIASTOLIC BLOOD PRESSURE: 90 MMHG | HEART RATE: 85 BPM | WEIGHT: 209 LBS

## 2024-01-29 DIAGNOSIS — E78.2 MIXED HYPERLIPIDEMIA: ICD-10-CM

## 2024-01-29 DIAGNOSIS — E55.9 VITAMIN D DEFICIENCY: ICD-10-CM

## 2024-01-29 DIAGNOSIS — E10.65 TYPE 1 DIABETES MELLITUS WITH HYPERGLYCEMIA: Primary | ICD-10-CM

## 2024-01-29 DIAGNOSIS — E06.3 HYPOTHYROIDISM DUE TO HASHIMOTO'S THYROIDITIS: ICD-10-CM

## 2024-01-29 DIAGNOSIS — Z96.41 INSULIN PUMP STATUS: ICD-10-CM

## 2024-01-29 DIAGNOSIS — E03.8 HYPOTHYROIDISM DUE TO HASHIMOTO'S THYROIDITIS: ICD-10-CM

## 2024-01-29 PROCEDURE — 99214 OFFICE O/P EST MOD 30 MIN: CPT | Performed by: INTERNAL MEDICINE

## 2024-01-29 RX ORDER — FLUCONAZOLE 200 MG/1
TABLET ORAL
COMMUNITY
Start: 2024-01-04

## 2024-01-29 RX ORDER — BENZOYL PEROXIDE 100 MG/ML
LIQUID TOPICAL
COMMUNITY
Start: 2024-01-05

## 2024-01-29 RX ORDER — KETOCONAZOLE 1 %
SHAMPOO TOPICAL
COMMUNITY
Start: 2024-01-04

## 2024-01-29 RX ORDER — CLINDAMYCIN PHOSPHATE 10 MG/G
GEL TOPICAL
COMMUNITY
Start: 2024-01-04

## 2024-01-29 RX ORDER — NYSTATIN 100000 [USP'U]/G
POWDER TOPICAL
COMMUNITY
Start: 2024-01-10

## 2024-01-29 RX ORDER — LEVOTHYROXINE SODIUM 0.03 MG/1
25 TABLET ORAL
Qty: 90 TABLET | Refills: 3 | Status: SHIPPED | OUTPATIENT
Start: 2024-01-29

## 2024-01-29 NOTE — PATIENT INSTRUCTIONS
Decrease salt intake.  Increase exercise as able.  Start levothyroxine 25 mcg one daily.  Change ICR to 1:5 all day.  Continue rosuvastatin & vit D supplements.  Call if blood sugars are running under 100 or over 200.  F/u in 6 months, with labs prior.

## 2024-01-30 NOTE — PROGRESS NOTES
[facility]        Patient Care Team:  Jarad Valente Jr., DO as PCP - General (Sports Medicine)  Carol Schreiber MD as Consulting Physician (Obstetrics and Gynecology)  Neyda Green MD as Consulting Physician (Endocrinology)    Chief Complaint:    Chief Complaint   Patient presents with    Diabetes     FU / DM Type 1 /  Dexcom         Subjective   Here for diabetes f/u  Blood sugars: high 100's  Exercise program: none   Taking vit D    Interval History:     Patient Complaints:  bl sugar drops w exercise  Patient Denies: severe hypoglycemia  History taken from: patient    Review of Systems:   Review of Systems   Constitutional:  Positive for fatigue and unexpected weight gain.   Eyes:  Negative for blurred vision.   Gastrointestinal:  Negative for nausea.   Endocrine: Negative for polyuria.   Neurological:  Negative for headache.   Gained 9 lb since last visit    Objective     Vital Signs  Heart Rate:  [85] 85  BP: (140)/(90) 140/90    Physical Exam:     General Appearance:    Alert, cooperative, in no acute distress. Obese   Head:    Normocephalic, without obvious abnormality, atraumatic   Eyes:            Lids and lashes normal, conjunctivae and sclerae normal, no   icterus, no pallor, corneas clear, PERRLA   Throat:   No oral lesions,  oral mucosa moist   Neck:   No adenopathy, supple,  no thyromegaly, no carotid bruit   Lungs:     Clear    Heart:    Regular rhythm and normal rate   Chest Wall:    No abnormalities observed   Abdomen:     Normal bowel sounds, soft                 Extremities:   Moves all extremities well, no edema               Pulses:   Pulses palpable and equal bilaterally   Skin:   Pump site clean   Neurologic:  DTR 1+, able to feel the 10g monofilament          Results Review:    I have reviewed the patient's new clinical results, labs & imaging.    Medication Review:   Prior to Admission medications    Medication Sig Start Date End Date  Taking? Authorizing Provider   benzoyl peroxide 10 % external wash  1/5/24  Yes Lexie Pham MD   clindamycin (CLINDAGEL) 1 % gel APPLY TOPICALLY TO THE AFFECTED AREA EVERY DAY AFTER WASHING 1/4/24  Yes Lexie Pham MD   Continuous Blood Gluc Sensor (Dexcom G6 Sensor) Every 10 (Ten) Days. 6/15/23  Yes Neyda Green MD   Continuous Blood Gluc Transmit (Dexcom G6 Transmitter) misc 1 each Every 3 (Three) Months. 6/15/23  Yes Neyda Green MD   fluconazole (DIFLUCAN) 200 MG tablet TAKE 1 TABLET BY MOUTH FOR 1 DAY 1/4/24  Yes Lexie Pham MD   fluticasone (CUTIVATE) 0.05 % cream APPLY SPARINGLY TWICE A DAY AS NEEDED TO THE AFFECTED AREA ON CHEST AND UNDER BREASTS 7/15/20  Yes Lexie Pham MD   glucose blood (Accu-Chek Guide) test strip 1 each by Other route As Needed. Use as instructed   Yes Lexie Pham MD   HumaLOG 100 UNIT/ML injection INJECT A MAXIMUM DAILY DOSE OF 60 UNITS SUBCUTANEOUSLY VIA INSULIN PUMP 6/15/23  Yes Neyda Green MD   hydrocortisone 2.5 % cream APPLY TO AFFECTED SITES UNDER BREASTS AS NEEDED FOR ITCHING. STOP UPON IMPROVEMENT. KEEP ON HAND FOR FLARES 2/23/23  Yes Lexie Pham MD   Insulin Disposable Pump (Omnipod 5 G6 Pod, Gen 5,) misc 1 each Every 3 (Three) Days. Change pod every 3 days 6/15/23  Yes Neyda Green MD   mupirocin (BACTROBAN) 2 % ointment APPLY TWICE DAILY X 10DAYS TO AXILLAE,UMBILICUS,GROIN AND PERIANAL AREAS 7/21/20  Yes Lexie Pham MD   Nizoral 1 % shampoo  1/4/24  Yes Lexie Pham MD   norgestimate-ethinyl estradiol (Tri-Estarylla) 0.18/0.215/0.25 MG-35 MCG per tablet Take 1 tablet by mouth Daily. 11/15/23  Yes Carol Schreiber MD   nystatin [id number] UNIT/GM topical powder  1/10/24  Yes Lexie Pham MD   rosuvastatin (CRESTOR) 20 MG tablet Take 1 tablet by mouth Daily. 11/21/23  Yes Neyda Green MD   valACYclovir (VALTREX) 500 MG tablet Take 1 tablet by mouth Daily.  11/9/22  Yes Carol Schreiber MD   vitamin D (ERGOCALCIFEROL) 1.25 MG (98692 UT) capsule capsule Take 1 capsule by mouth 1 (One) Time Per Week. 11/21/23  Yes Neyda Green MD   levothyroxine (SYNTHROID, LEVOTHROID) 25 MCG tablet Take 1 tablet by mouth Every Morning. 1/29/24   Neyda Green MD         Lab Results   Component Value Date    HGBA1C 8.3 (H) 11/07/2022    HGBA1C 10.7 (H) 04/27/2022    HGBA1C 10.7 04/27/2022      Lab Results   Component Value Date    GLUCOSE 301 (H) 10/28/2021    BUN 15 10/28/2021    CREATININE 0.86 10/28/2021    EGFRIFNONA 82 10/28/2021    EGFRIFAFRI 95 10/28/2021    BCR 17 10/28/2021    K 4.6 10/28/2021    CO2 24 10/28/2021    CALCIUM 9.1 10/28/2021    PROTENTOTREF 7.2 10/28/2021    ALBUMIN 3.9 10/28/2021    LABIL2 1.2 10/28/2021    AST 17 10/28/2021    ALT 12 10/28/2021     (H) 11/07/2022    HDL 46 11/07/2022    TRIG 138 11/07/2022     Lab Results   Component Value Date    TSH 3.750 11/07/2022    PNYT26MZ 33.7 11/07/2022     A1C 8.7%; alb/cr ratio 4; Chol 199, Trigl 72, , HDL 56; TSH 4.27; vit D level 60 on 1/22/2024    Assessment & Plan     Diagnoses and all orders for this visit:    1. Type 1 diabetes mellitus with hyperglycemia (Primary) - not @ goal  -     Comprehensive Metabolic Panel; Future  -     Hemoglobin A1c; Future    2. Mixed hyperlipidemia - stable    3. Vitamin D deficiency - stable    4. Hypothyroidism due to Hashimoto's thyroiditis - new dx  -     levothyroxine (SYNTHROID, LEVOTHROID) 25 MCG tablet; Take 1 tablet by mouth Every Morning.  Dispense: 90 tablet; Refill: 3  -     T4, Free; Future  -     TSH; Future    5. Insulin pump status    Wearing an OmniPod 5 since July 2022.  Pt has agreed to wear the CGM device and share readings on a regular basis with our office    Decrease salt intake.  Increase exercise as able.  Start levothyroxine 25 mcg one daily.  Info on Hashimoto's Thyroiditis given to pt.  Change ICR to 1:5 all  day.  Continue rosuvastatin & vit D supplements.  Call if blood sugars are running under 100 or over 200.        Neyda Green MD  01/29/24  23:14 EST

## 2024-02-08 RX ORDER — NITROFURANTOIN 25; 75 MG/1; MG/1
100 CAPSULE ORAL 2 TIMES DAILY
Qty: 14 CAPSULE | Refills: 0 | Status: SHIPPED | OUTPATIENT
Start: 2024-02-08 | End: 2024-02-15

## 2024-05-07 DIAGNOSIS — E06.3 HYPOTHYROIDISM DUE TO HASHIMOTO'S THYROIDITIS: ICD-10-CM

## 2024-05-07 DIAGNOSIS — E03.8 HYPOTHYROIDISM DUE TO HASHIMOTO'S THYROIDITIS: ICD-10-CM

## 2024-05-07 RX ORDER — LEVOTHYROXINE SODIUM 0.03 MG/1
25 TABLET ORAL
Qty: 90 TABLET | Refills: 3 | Status: SHIPPED | OUTPATIENT
Start: 2024-05-07

## 2024-05-23 DIAGNOSIS — E10.65 TYPE 1 DIABETES MELLITUS WITH HYPERGLYCEMIA: ICD-10-CM

## 2024-05-23 RX ORDER — INSULIN LISPRO 100 [IU]/ML
INJECTION, SOLUTION INTRAVENOUS; SUBCUTANEOUS
Qty: 60 ML | Refills: 3 | Status: SHIPPED | OUTPATIENT
Start: 2024-05-23

## 2024-05-23 RX ORDER — PROCHLORPERAZINE 25 MG/1
SUPPOSITORY RECTAL
Qty: 9 EACH | Refills: 3 | Status: SHIPPED | OUTPATIENT
Start: 2024-05-23

## 2024-05-23 RX ORDER — PROCHLORPERAZINE 25 MG/1
1 SUPPOSITORY RECTAL
Qty: 1 EACH | Refills: 3 | Status: SHIPPED | OUTPATIENT
Start: 2024-05-23

## 2024-05-28 ENCOUNTER — TELEPHONE (OUTPATIENT)
Dept: ENDOCRINOLOGY | Facility: CLINIC | Age: 49
End: 2024-05-28
Payer: COMMERCIAL

## 2024-05-28 NOTE — TELEPHONE ENCOUNTER
PA started for Dexcom G6 sensor  Key: [id number]  Exp. 5/28/2025    PA started for Dexcom G6 transmitter  Per CovermyMeds available without authorization.       PA started for Omnipod pods  Key: [id number]  Exp. 5/28/2025

## 2024-06-04 DIAGNOSIS — E10.65 TYPE 1 DIABETES MELLITUS WITH HYPERGLYCEMIA: ICD-10-CM

## 2024-06-05 RX ORDER — PROCHLORPERAZINE 25 MG/1
SUPPOSITORY RECTAL
Qty: 1 EACH | Refills: 0 | OUTPATIENT
Start: 2024-06-05

## 2024-06-06 DIAGNOSIS — E10.65 TYPE 1 DIABETES MELLITUS WITH HYPERGLYCEMIA: ICD-10-CM

## 2024-06-06 RX ORDER — INSULIN PMP CART,AUT,G6/7,CNTR
EACH SUBCUTANEOUS
Qty: 30 EACH | Refills: 3 | Status: SHIPPED | OUTPATIENT
Start: 2024-06-06

## 2024-08-07 ENCOUNTER — PATIENT MESSAGE (OUTPATIENT)
Dept: ENDOCRINOLOGY | Facility: CLINIC | Age: 49
End: 2024-08-07
Payer: COMMERCIAL

## 2024-08-09 DIAGNOSIS — E10.65 TYPE 1 DIABETES MELLITUS WITH HYPERGLYCEMIA: ICD-10-CM

## 2024-08-09 RX ORDER — INSULIN LISPRO 100 [IU]/ML
INJECTION, SOLUTION INTRAVENOUS; SUBCUTANEOUS
Qty: 20 ML | Refills: 0 | Status: SHIPPED | OUTPATIENT
Start: 2024-08-09

## 2024-08-09 NOTE — TELEPHONE ENCOUNTER
Called pt and informed her to reach out to her insurance for a temporary supply of insulin due to her pod malfunctioning. Pt voiced understanding and will call back once done.

## 2024-08-17 LAB
ALBUMIN SERPL-MCNC: 3.6 G/DL (ref 3.9–4.9)
ALP SERPL-CCNC: 94 IU/L (ref 44–121)
ALT SERPL-CCNC: 13 IU/L (ref 0–32)
AST SERPL-CCNC: 21 IU/L (ref 0–40)
BILIRUB SERPL-MCNC: 0.4 MG/DL (ref 0–1.2)
BUN SERPL-MCNC: 11 MG/DL (ref 6–24)
BUN/CREAT SERPL: 12 (ref 9–23)
CALCIUM SERPL-MCNC: 9.1 MG/DL (ref 8.7–10.2)
CHLORIDE SERPL-SCNC: 98 MMOL/L (ref 96–106)
CO2 SERPL-SCNC: 23 MMOL/L (ref 20–29)
CREAT SERPL-MCNC: 0.9 MG/DL (ref 0.57–1)
EGFRCR SERPLBLD CKD-EPI 2021: 79 ML/MIN/1.73
GLOBULIN SER CALC-MCNC: 2.7 G/DL (ref 1.5–4.5)
GLUCOSE SERPL-MCNC: 174 MG/DL (ref 70–99)
HBA1C MFR BLD: 8.5 % (ref 4.8–5.6)
POTASSIUM SERPL-SCNC: 4.8 MMOL/L (ref 3.5–5.2)
PROT SERPL-MCNC: 6.3 G/DL (ref 6–8.5)
SODIUM SERPL-SCNC: 133 MMOL/L (ref 134–144)
T4 FREE SERPL-MCNC: 1.42 NG/DL (ref 0.82–1.77)
TSH SERPL DL<=0.005 MIU/L-ACNC: 3.98 UIU/ML (ref 0.45–4.5)

## 2024-08-28 ENCOUNTER — OFFICE VISIT (OUTPATIENT)
Dept: ENDOCRINOLOGY | Facility: CLINIC | Age: 49
End: 2024-08-28
Payer: COMMERCIAL

## 2024-08-28 VITALS
SYSTOLIC BLOOD PRESSURE: 100 MMHG | WEIGHT: 164 LBS | OXYGEN SATURATION: 98 % | BODY MASS INDEX: 26.36 KG/M2 | HEIGHT: 66 IN | DIASTOLIC BLOOD PRESSURE: 80 MMHG | HEART RATE: 82 BPM

## 2024-08-28 DIAGNOSIS — E06.3 HYPOTHYROIDISM DUE TO HASHIMOTO'S THYROIDITIS: ICD-10-CM

## 2024-08-28 DIAGNOSIS — E03.8 HYPOTHYROIDISM DUE TO HASHIMOTO'S THYROIDITIS: ICD-10-CM

## 2024-08-28 DIAGNOSIS — E10.65 TYPE 1 DIABETES MELLITUS WITH HYPERGLYCEMIA: Primary | ICD-10-CM

## 2024-08-28 DIAGNOSIS — Z96.41 INSULIN PUMP STATUS: ICD-10-CM

## 2024-08-28 DIAGNOSIS — E55.9 VITAMIN D DEFICIENCY: ICD-10-CM

## 2024-08-28 DIAGNOSIS — E78.2 MIXED HYPERLIPIDEMIA: ICD-10-CM

## 2024-08-28 PROCEDURE — 99214 OFFICE O/P EST MOD 30 MIN: CPT | Performed by: INTERNAL MEDICINE

## 2024-08-28 RX ORDER — BLOOD SUGAR DIAGNOSTIC
STRIP MISCELLANEOUS
Qty: 100 EACH | Refills: 3 | Status: SHIPPED | OUTPATIENT
Start: 2024-08-28

## 2024-08-28 NOTE — PATIENT INSTRUCTIONS
See eye doctor in Oct.  Continue diet & exercise.  Continue same pump settings. Enter your new wt in.  Call if blood sugars are running under 100 or over 200.  F/u in 6 months, with fasting labs prior.

## 2024-08-28 NOTE — PROGRESS NOTES
[facility]        Patient Care Team:  Jarad Valente Jr., DO as PCP - General (Sports Medicine)  Carol Schreiber MD as Consulting Physician (Obstetrics and Gynecology)  Neyda Green MD as Consulting Physician (Endocrinology)    Chief Complaint:    Chief Complaint   Patient presents with    Diabetes     Fu / DM type 1 / BS Dexcom 160         Subjective   Here for diabetes f/u  Blood sugars: in the high 100's  Exercise program: walking some  Doing [facility] program since May  Off thyroid & chol meds  Took multivitamins as part of program    Interval History:     Patient Complaints:  bl sugar drops w exercise  Patient Denies: severe hypoglycemia  History taken from: patient    Review of Systems:   Review of Systems   Eyes:  Negative for blurred vision.   Gastrointestinal:  Negative for nausea.   Endocrine: Negative for polyuria.   Neurological:  Negative for headache.   Lost  50 lb since last visit    Objective     Vital Signs  Heart Rate:  [82] 82  BP: (100)/(80) 100/80    Physical Exam:     General Appearance:    Alert, cooperative, in no acute distress   Head:    Normocephalic, without obvious abnormality, atraumatic   Eyes:            Lids and lashes normal, conjunctivae and sclerae normal, no   icterus, no pallor, corneas clear, PERRLA   Throat:   No oral lesions,  oral mucosa moist   Neck:   No adenopathy, supple,  no thyromegaly, no carotid bruit   Lungs:     Clear    Heart:    Regular rhythm and normal rate   Chest Wall:    No abnormalities observed   Abdomen:     Normal bowel sounds, soft                 Extremities:   Moves all extremities well, no edema               Pulses:   Pulses palpable and equal bilaterally   Skin:   Pump site clean   Neurologic:  DTR absent in ankles, vibratory sense 25% decreased in toes, able to feel the 10g monofilament          Results Review:    I have reviewed the patient's new clinical results, labs &  imaging.    Medication Review:   Prior to Admission medications    Medication Sig Start Date End Date Taking? Authorizing Provider   benzoyl peroxide 10 % external wash  1/5/24  Yes Lexie Pham MD   clindamycin (CLINDAGEL) 1 % gel APPLY TOPICALLY TO THE AFFECTED AREA EVERY DAY AFTER WASHING 1/4/24  Yes Lexie Pham MD   Continuous Glucose Sensor (Dexcom G6 Sensor) Use Every 10 (Ten) Days. 5/23/24  Yes Neyda Green MD   Continuous Glucose Transmitter (Dexcom G6 Transmitter) misc Use 1 each Every 3 (Three) Months. 5/23/24  Yes Neyda Green MD   fluconazole (DIFLUCAN) 200 MG tablet TAKE 1 TABLET BY MOUTH FOR 1 DAY 1/4/24  Yes Lexie Pham MD   fluticasone (CUTIVATE) 0.05 % cream APPLY SPARINGLY TWICE A DAY AS NEEDED TO THE AFFECTED AREA ON CHEST AND UNDER BREASTS 7/15/20  Yes Lexie Pham MD   glucose blood (Accu-Chek Guide) test strip To check blood sugar once a day 8/28/24  Yes Neyda Green MD   HumaLOG 100 UNIT/ML injection INJECT A MAXIMUM DAILY DOSE OF 60 UNITS SUBCUTANEOUSLY VIA INSULIN PUMP 8/9/24  Yes Neyda Green MD   hydrocortisone 2.5 % cream APPLY TO AFFECTED SITES UNDER BREASTS AS NEEDED FOR ITCHING. STOP UPON IMPROVEMENT. KEEP ON HAND FOR FLARES 2/23/23  Yes Lexie Pham MD   Insulin Disposable Pump (Omnipod 5 G6 Pods, Gen 5,) misc CHANGE POD EVERY 3 DAYS 6/6/24  Yes Neyda Green MD   mupirocin (BACTROBAN) 2 % ointment APPLY TWICE DAILY X 10DAYS TO AXILLAE,UMBILICUS,GROIN AND PERIANAL AREAS 7/21/20  Yes Lexie Pham MD   Nizoral 1 % shampoo  1/4/24  Yes Lexie Pham MD   nystatin [id number] UNIT/GM topical powder  1/10/24  Yes Lexie Pham MD   valACYclovir (VALTREX) 500 MG tablet Take 1 tablet by mouth Daily. 11/9/22  Yes Carol Schreiber MD   vitamin D (ERGOCALCIFEROL) 1.25 MG (94071 UT) capsule capsule Take 1 capsule by mouth 1 (One) Time Per Week. 11/21/23  Yes Neyda Green MD   glucose  blood (Accu-Chek Guide) test strip 1 each by Other route As Needed. Use as instructed  8/28/24 Yes Provider, MD Lexie   levothyroxine (SYNTHROID, LEVOTHROID) 25 MCG tablet Take 1 tablet by mouth Every Morning.  Patient not taking: Reported on 8/28/2024 5/7/24   Neyda Green MD   norgestimate-ethinyl estradiol (Tri-Estarylla) 0.18/0.215/0.25 MG-35 MCG per tablet Take 1 tablet by mouth Daily.  Patient not taking: Reported on 8/28/2024 11/15/23 8/28/24  Carol Schreiber MD   rosuvastatin (CRESTOR) 20 MG tablet Take 1 tablet by mouth Daily.  Patient not taking: Reported on 8/28/2024 11/21/23 8/28/24  Neyda Green MD         Lab Results   Component Value Date    HGBA1C 8.5 (H) 08/16/2024    HGBA1C 8.3 (H) 11/07/2022    HGBA1C 10.7 (H) 04/27/2022      Lab Results   Component Value Date    GLUCOSE 174 (H) 08/16/2024    BUN 11 08/16/2024    CREATININE 0.90 08/16/2024    EGFRIFNONA 82 10/28/2021    EGFRIFAFRI 95 10/28/2021    BCR 12 08/16/2024    K 4.8 08/16/2024    CO2 23 08/16/2024    CALCIUM 9.1 08/16/2024    PROTENTOTREF 6.3 08/16/2024    ALBUMIN 3.6 (L) 08/16/2024    LABIL2 1.2 10/28/2021    AST 21 08/16/2024    ALT 13 08/16/2024     (H) 11/07/2022    HDL 46 11/07/2022    TRIG 138 11/07/2022     Lab Results   Component Value Date    TSH 3.980 08/16/2024    FREET4 1.42 08/16/2024    NSBI31RY 33.7 11/07/2022       Assessment & Plan     Diagnoses and all orders for this visit:    1. Type 1 diabetes mellitus with hyperglycemia (Primary) - improved  -     Hemoglobin A1c; Future  -     Microalbumin / Creatinine Urine Ratio - Urine, Clean Catch; Future  -     Lipid Panel; Future  -     glucose blood (Accu-Chek Guide) test strip; To check blood sugar once a day  Dispense: 100 each; Refill: 3    2. Vitamin D deficiency - will check status next visit  -     Vitamin D,25-Hydroxy; Future    3. Hypothyroidism due to Hashimoto's thyroiditis - stable  -     TSH; Future  -     T4, Free; Future    4.  Mixed hyperlipidemia - will check status next visit  -     Comprehensive Metabolic Panel; Future  -     Lipid Panel; Future    5. Insulin pump status    Wearing an OmniPod 5 since July 2022.  Pt has agreed to wear the CGM device and share readings on a regular basis with our office    See eye doctor in Oct.  Continue diet & exercise.  Continue same pump settings. Enter your new wt in.  Call if blood sugars are running under 100 or over 200.  Will work w pump  to avoid hypoglycemia during exercise.        Neyda Green MD  08/28/24  18:23 EDT

## 2024-10-15 ENCOUNTER — PATIENT MESSAGE (OUTPATIENT)
Dept: ENDOCRINOLOGY | Facility: CLINIC | Age: 49
End: 2024-10-15
Payer: COMMERCIAL

## 2024-10-15 DIAGNOSIS — E10.65 TYPE 1 DIABETES MELLITUS WITH HYPERGLYCEMIA: ICD-10-CM

## 2024-10-16 DIAGNOSIS — E10.65 TYPE 1 DIABETES MELLITUS WITH HYPERGLYCEMIA: ICD-10-CM

## 2024-10-16 RX ORDER — PROCHLORPERAZINE 25 MG/1
1 SUPPOSITORY RECTAL
Qty: 1 EACH | Refills: 3 | Status: SHIPPED | OUTPATIENT
Start: 2024-10-16

## 2024-10-16 RX ORDER — PROCHLORPERAZINE 25 MG/1
SUPPOSITORY RECTAL
Qty: 1 EACH | Refills: 0 | OUTPATIENT
Start: 2024-10-16

## 2024-12-27 ENCOUNTER — PATIENT MESSAGE (OUTPATIENT)
Dept: SPORTS MEDICINE | Facility: CLINIC | Age: 49
End: 2024-12-27
Payer: COMMERCIAL

## 2024-12-27 ENCOUNTER — PATIENT MESSAGE (OUTPATIENT)
Dept: ENDOCRINOLOGY | Facility: CLINIC | Age: 49
End: 2024-12-27
Payer: COMMERCIAL

## 2024-12-27 RX ORDER — ONDANSETRON 4 MG/1
4 TABLET, ORALLY DISINTEGRATING ORAL EVERY 6 HOURS PRN
Qty: 30 TABLET | Refills: 1 | Status: SHIPPED | OUTPATIENT
Start: 2024-12-27

## 2025-01-04 DIAGNOSIS — E55.9 VITAMIN D DEFICIENCY: ICD-10-CM

## 2025-01-06 RX ORDER — ERGOCALCIFEROL 1.25 MG/1
50000 CAPSULE, LIQUID FILLED ORAL WEEKLY
Qty: 12 CAPSULE | Refills: 3 | Status: SHIPPED | OUTPATIENT
Start: 2025-01-06

## 2025-01-28 ENCOUNTER — PATIENT MESSAGE (OUTPATIENT)
Dept: ENDOCRINOLOGY | Facility: CLINIC | Age: 50
End: 2025-01-28
Payer: COMMERCIAL

## 2025-01-28 DIAGNOSIS — E06.3 HYPOTHYROIDISM DUE TO HASHIMOTO'S THYROIDITIS: ICD-10-CM

## 2025-01-29 DIAGNOSIS — E78.2 MIXED HYPERLIPIDEMIA: Primary | ICD-10-CM

## 2025-01-29 RX ORDER — ROSUVASTATIN CALCIUM 20 MG/1
20 TABLET, COATED ORAL DAILY
Qty: 90 TABLET | Refills: 3 | Status: SHIPPED | OUTPATIENT
Start: 2025-01-29

## 2025-01-29 RX ORDER — LEVOTHYROXINE SODIUM 25 UG/1
25 TABLET ORAL
Qty: 90 TABLET | Refills: 3 | Status: SHIPPED | OUTPATIENT
Start: 2025-01-29

## 2025-03-14 ENCOUNTER — PATIENT MESSAGE (OUTPATIENT)
Dept: OBSTETRICS AND GYNECOLOGY | Facility: CLINIC | Age: 50
End: 2025-03-14
Payer: COMMERCIAL

## 2025-03-14 DIAGNOSIS — R30.0 DYSURIA: Primary | ICD-10-CM

## 2025-03-17 ENCOUNTER — CLINICAL SUPPORT (OUTPATIENT)
Dept: OBSTETRICS AND GYNECOLOGY | Facility: CLINIC | Age: 50
End: 2025-03-17
Payer: COMMERCIAL

## 2025-03-17 DIAGNOSIS — R30.0 DYSURIA: ICD-10-CM

## 2025-03-20 LAB
APPEARANCE UR: CLEAR
BACTERIA #/AREA URNS HPF: ABNORMAL /[HPF]
BACTERIA UR CULT: ABNORMAL
BILIRUB UR QL STRIP: NEGATIVE
CASTS URNS QL MICRO: ABNORMAL /LPF
COLOR UR: YELLOW
EPI CELLS #/AREA URNS HPF: ABNORMAL /HPF (ref 0–10)
GLUCOSE UR QL STRIP: NEGATIVE
HGB UR QL STRIP: NEGATIVE
KETONES UR QL STRIP: NEGATIVE
LEUKOCYTE ESTERASE UR QL STRIP: ABNORMAL
MICRO URNS: ABNORMAL
NITRITE UR QL STRIP: NEGATIVE
OTHER ANTIBIOTIC SUSC ISLT: ABNORMAL
OTHER ANTIBIOTIC SUSC ISLT: ABNORMAL
PH UR STRIP: 6.5 [PH] (ref 5–7.5)
PROT UR QL STRIP: NEGATIVE
RBC #/AREA URNS HPF: ABNORMAL /HPF (ref 0–2)
SP GR UR STRIP: 1.01 (ref 1–1.03)
URINALYSIS REFLEX: ABNORMAL
UROBILINOGEN UR STRIP-MCNC: 0.2 MG/DL (ref 0.2–1)
WBC #/AREA URNS HPF: ABNORMAL /HPF (ref 0–5)

## 2025-04-02 ENCOUNTER — OFFICE VISIT (OUTPATIENT)
Dept: OBSTETRICS AND GYNECOLOGY | Facility: CLINIC | Age: 50
End: 2025-04-02
Payer: COMMERCIAL

## 2025-04-02 VITALS
BODY MASS INDEX: 25.39 KG/M2 | HEIGHT: 66 IN | DIASTOLIC BLOOD PRESSURE: 84 MMHG | SYSTOLIC BLOOD PRESSURE: 130 MMHG | WEIGHT: 158 LBS

## 2025-04-02 DIAGNOSIS — Z01.419 CERVICAL SMEAR, AS PART OF ROUTINE GYNECOLOGICAL EXAMINATION: ICD-10-CM

## 2025-04-02 DIAGNOSIS — Z01.419 ROUTINE GYNECOLOGICAL EXAMINATION: Primary | ICD-10-CM

## 2025-04-02 DIAGNOSIS — Z11.51 SCREENING FOR HUMAN PAPILLOMAVIRUS: ICD-10-CM

## 2025-04-02 DIAGNOSIS — Z12.11 SCREENING FOR COLON CANCER: ICD-10-CM

## 2025-04-02 DIAGNOSIS — R30.0 DYSURIA: ICD-10-CM

## 2025-04-02 DIAGNOSIS — Z12.31 ENCOUNTER FOR SCREENING MAMMOGRAM FOR MALIGNANT NEOPLASM OF BREAST: ICD-10-CM

## 2025-04-02 PROBLEM — Z30.41 ORAL CONTRACEPTIVE USE: Status: RESOLVED | Noted: 2017-05-24 | Resolved: 2025-04-02

## 2025-04-02 NOTE — PROGRESS NOTES
GYN Annual Exam     CC- Here for annual exam.     Luis Wilcox is a 49 y.o. female established patient who presents for annual well woman exam. She stopped OCPs about a year ago and has regular cycles.  She is not sexually active at present.  She did have a UTI last month that was treated.  Will check her urine culture today.  We had a nice discussion about menopause and she was given information for review.  Her daughters are in college and are studying and accounting and event planning/business      OB History          2    Para   2    Term   2            AB        Living   2         SAB        IAB        Ectopic        Molar        Multiple        Live Births              Obstetric Comments   2                Menarche: 13  Current contraception: Abstinence  History of abnormal Pap smear: yes -  1 abnormal with normal followup  History of abnormal mammogram: yes - s/p B9 breast bx  Family history of uterine, colon or ovarian cancer: no  Family history of breast cancer: no  H/o STDs: HSV 2  Gardasil: none  Last pap: 2023- normal pap/ neg HPV  Gardasil:missed  CHUNG: none    Health Maintenance   Topic Date Due    Hepatitis B (1 of 3 - 19+ 3-dose series) Never done    HEPATITIS C SCREENING  Never done    Pneumococcal Vaccine 0-49 (2 of 2 - PCV) 2019    ANNUAL PHYSICAL  2019    DIABETIC FOOT EXAM  2023    COVID-19 Vaccine (2024- season) 2024    DIABETIC EYE EXAM  10/06/2024    Annual Gynecologic Pelvic and Breast Exam  2024    LIPID PANEL  2025    URINE MICROALBUMIN-CREATININE RATIO (uACR)  2025    HEMOGLOBIN A1C  2025    INFLUENZA VACCINE  2025    COLORECTAL CANCER SCREENING  2025    MAMMOGRAM  2026    PAP SMEAR  11/15/2026    TDAP/TD VACCINES (2 - Td or Tdap) 2028       Past Medical History:   Diagnosis Date    Abnormal Pap smear of cervix     normal pap + HPV ,  both were normal    Allergic Outside allergies     Dust & molds indoors    Diabetes mellitus     Diabetes mellitus type I     Herpes     HSV 2    HPV (human papilloma virus) infection     Hyperlipidemia        Past Surgical History:   Procedure Laterality Date    BASAL CELL CARCINOMA EXCISION Right 04/13/2023    BREAST BIOPSY      EAR TUBES      TONSILLECTOMY  1982    WISDOM TOOTH EXTRACTION           Current Outpatient Medications:     Continuous Glucose Sensor (Dexcom G6 Sensor), Use Every 10 (Ten) Days., Disp: 9 each, Rfl: 3    Continuous Glucose Transmitter (Dexcom G6 Transmitter) misc, Use 1 each Every 3 (Three) Months. E10.65, Disp: 1 each, Rfl: 3    fluticasone (CUTIVATE) 0.05 % cream, APPLY SPARINGLY TWICE A DAY AS NEEDED TO THE AFFECTED AREA ON CHEST AND UNDER BREASTS, Disp: , Rfl:     glucose blood (Accu-Chek Guide) test strip, To check blood sugar once a day, Disp: 100 each, Rfl: 3    HumaLOG 100 UNIT/ML injection, INJECT A MAXIMUM DAILY DOSE OF 60 UNITS SUBCUTANEOUSLY VIA INSULIN PUMP, Disp: 20 mL, Rfl: 0    hydrocortisone 2.5 % cream, APPLY TO AFFECTED SITES UNDER BREASTS AS NEEDED FOR ITCHING. STOP UPON IMPROVEMENT. KEEP ON HAND FOR FLARES, Disp: , Rfl:     Insulin Disposable Pump (Omnipod 5 G6 Pods, Gen 5,) misc, CHANGE POD EVERY 3 DAYS, Disp: 30 each, Rfl: 3    levothyroxine (SYNTHROID, LEVOTHROID) 25 MCG tablet, TAKE 1 TABLET BY MOUTH EVERY MORNING, Disp: 90 tablet, Rfl: 3    mupirocin (BACTROBAN) 2 % ointment, APPLY TWICE DAILY X 10DAYS TO AXILLAE,UMBILICUS,GROIN AND PERIANAL AREAS, Disp: , Rfl:     Nizoral 1 % shampoo, , Disp: , Rfl:     nystatin [id number] UNIT/GM topical powder, , Disp: , Rfl:     ondansetron ODT (ZOFRAN-ODT) 4 MG disintegrating tablet, Place 1 tablet on the tongue Every 6 (Six) Hours As Needed for Nausea or Vomiting., Disp: 30 tablet, Rfl: 1    rosuvastatin (CRESTOR) 20 MG tablet, Take 1 tablet by mouth Daily., Disp: 90 tablet, Rfl: 3    valACYclovir (VALTREX) 500 MG tablet, Take 1 tablet by mouth Daily., Disp: 90 tablet, Rfl:  "0    vitamin D (ERGOCALCIFEROL) 1.25 MG (55228 UT) capsule capsule, TAKE 1 CAPSULE ONCE WEEKLY, Disp: 12 capsule, Rfl: 3    No Known Allergies    Social History     Tobacco Use    Smoking status: Never    Smokeless tobacco: Never   Vaping Use    Vaping status: Never Used   Substance Use Topics    Alcohol use: Not Currently     Alcohol/week: 1.0 standard drink of alcohol     Comment: Occasionally    Drug use: No       Family History   Problem Relation Age of Onset    Hyperlipidemia Father     Diabetes Paternal Grandfather     Stroke Paternal Grandfather     No Known Problems Mother     No Known Problems Brother     No Known Problems Daughter     No Known Problems Daughter     Breast cancer Neg Hx     Ovarian cancer Neg Hx     Colon cancer Neg Hx     Deep vein thrombosis Neg Hx     Pulmonary embolism Neg Hx        Review of Systems   Constitutional:  Negative for activity change, appetite change, fatigue, fever and unexpected weight change.   Eyes:  Negative for photophobia and visual disturbance.   Respiratory:  Negative for cough and shortness of breath.    Cardiovascular:  Negative for chest pain and palpitations.   Gastrointestinal:  Negative for abdominal distention (bloating.), abdominal pain, constipation, diarrhea and nausea.   Endocrine: Negative for cold intolerance and heat intolerance.   Genitourinary:  Negative for dyspareunia, dysuria, menstrual problem, pelvic pain, vaginal bleeding and vaginal discharge.   Musculoskeletal:  Negative for back pain.   Skin:  Negative for color change and rash.   Neurological:  Negative for headaches.   Hematological:  Negative for adenopathy. Does not bruise/bleed easily.   Psychiatric/Behavioral:  Negative for dysphoric mood. The patient is not nervous/anxious.        /84   Ht 167.6 cm (66\")   Wt 71.7 kg (158 lb)   LMP 02/20/2025 (Exact Date)   BMI 25.50 kg/m²     Physical Exam   Constitutional: She is oriented to person, place, and time. She appears " well-developed. She does not appear ill.   HENT:   Head: Normocephalic and atraumatic.   Eyes: Conjunctivae are normal. No scleral icterus.   Neck: No thyromegaly present.   Cardiovascular: Normal rate, regular rhythm and normal heart sounds.   Pulmonary/Chest: Effort normal and breath sounds normal. Right breast exhibits mass. Right breast exhibits no inverted nipple, no nipple discharge, no skin change and no tenderness. Left breast exhibits no inverted nipple, no mass, no nipple discharge, no skin change and no tenderness.       Abdominal: Soft. Normal appearance and bowel sounds are normal. She exhibits no distension and no mass. There is no abdominal tenderness. There is no rebound and no guarding. No hernia.   Genitourinary:    Vulva and rectum normal.      Pelvic exam was performed with patient supine.   There is no rash, tenderness or lesion on the right labia. There is no rash, tenderness or lesion on the left labia. Uterus is not deviated, not enlarged, not fixed and not tender. Cervix exhibits no motion tenderness, no discharge and no friability. Right adnexum displays no mass, no tenderness and no fullness. Left adnexum displays no mass, no tenderness and no fullness.    No vaginal discharge, erythema, tenderness or bleeding.   No erythema, tenderness or bleeding in the vagina.    No foreign body in the vagina.      No signs of injury in the vagina.      Genitourinary Comments: Exam limited by habitus     Neurological: She is alert and oriented to person, place, and time.   Skin: Skin is warm and dry.   Psychiatric: Her behavior is normal. Mood, judgment and thought content normal.   Nursing note and vitals reviewed.         Assessment/Plan    1) GYN HM: normal pap/HPV 11/2023, check pap/HPV. SBE demonstrated and encouraged.  2) STD screening: declines Condoms encouraged.  3) Contraception: Abstinence  4) Family Planning: no plans, encourage folic acid daily  5) Diet and Exercise discussed  6) Smoking  "Status: No  7) .  MMG- UTD 1/2024, B2. Schedule MMG now, patient prefers priority radiology  8) Breast mass- pt had normal bx of area in 2017, declines removal. Mass is unchanged but is large and may obscure other findings.  9) HSV 2-declines suppression with Valtrex.  Has rare outbreaks  10) Cscope- pt declines,  Cologuard neg 11/2022.  Repeat in 3 years.  New kit sent in for 11/2025, pt aware tests are not equivalent in the detection of colon cancer  11)Menopause is one whole year without a menstrual cycle in the correct age individual.  The \"perimenopause\" refers to hormonal changes and symptoms that may occur in the 5 to 10 years prior to cycles actually stopping.  The symptoms may include hot flashes, night sweats, insomnia or altered quality of sleep, moodiness, irritability, weight gain, hair loss or growth, libido changes as well as changes in the length and severity of menstrual bleeding.  Any vaginal bleeding that last longer than 10 days, any cycles that are closer together than 21 days or any cycles that are very heavy should prompt an appointment for evaluation.  12)H/O UTI- check UA and CS  13)Parts of this document have been copied or forwarded from her previous visits and have been reviewed, updated and edited as indicated.   14)Follow up prn or 1 year annual        Diagnoses and all orders for this visit:    1. Routine gynecological examination (Primary)  -     POC Urinalysis Dipstick, Multipro  -     IGP, Apt HPV,rfx 16 / 18,45    2. Cervical smear, as part of routine gynecological examination  -     IGP, Apt HPV,rfx 16 / 18,45    3. Screening for human papillomavirus  -     IGP, Apt HPV,rfx 16 / 18,45    4. Dysuria  -     Urinalysis With Microscopic - Urine, Clean Catch  -     Urine Culture - Urine, Urine, Random Void    5. Encounter for screening mammogram for malignant neoplasm of breast  -     Mammo Screening Digital Tomosynthesis Bilateral With CAD; Future    6. Screening for colon cancer  -   "   Cologuard - Stool, Per Rectum; Future            Carol Schreiber MD  4/1/2025    14:56 EDT

## 2025-04-04 LAB
APPEARANCE UR: CLEAR
BACTERIA #/AREA URNS HPF: ABNORMAL /HPF
BACTERIA UR CULT: NO GROWTH
BACTERIA UR CULT: NORMAL
BILIRUB UR QL STRIP: NEGATIVE
CASTS URNS MICRO: ABNORMAL
COLOR UR: YELLOW
CYTOLOGIST CVX/VAG CYTO: NORMAL
CYTOLOGY CVX/VAG DOC CYTO: NORMAL
CYTOLOGY CVX/VAG DOC THIN PREP: NORMAL
DX ICD CODE: NORMAL
EPI CELLS #/AREA URNS HPF: ABNORMAL /HPF
GLUCOSE UR QL STRIP: NEGATIVE
HGB UR QL STRIP: NEGATIVE
HPV I/H RISK 4 DNA CVX QL PROBE+SIG AMP: NEGATIVE
KETONES UR QL STRIP: ABNORMAL
LEUKOCYTE ESTERASE UR QL STRIP: ABNORMAL
NITRITE UR QL STRIP: NEGATIVE
OTHER STN SPEC: NORMAL
PH UR STRIP: 5.5 [PH] (ref 5–8)
PROT UR QL STRIP: NEGATIVE
RBC #/AREA URNS HPF: ABNORMAL /HPF
SERVICE CMNT-IMP: NORMAL
SP GR UR STRIP: 1.01 (ref 1–1.03)
STAT OF ADQ CVX/VAG CYTO-IMP: NORMAL
UROBILINOGEN UR STRIP-MCNC: ABNORMAL MG/DL
WBC #/AREA URNS HPF: ABNORMAL /HPF

## 2025-04-16 ENCOUNTER — OFFICE VISIT (OUTPATIENT)
Dept: ENDOCRINOLOGY | Facility: CLINIC | Age: 50
End: 2025-04-16
Payer: COMMERCIAL

## 2025-04-16 VITALS
BODY MASS INDEX: 25.2 KG/M2 | WEIGHT: 156.8 LBS | HEIGHT: 66 IN | DIASTOLIC BLOOD PRESSURE: 62 MMHG | SYSTOLIC BLOOD PRESSURE: 118 MMHG | HEART RATE: 80 BPM | OXYGEN SATURATION: 98 %

## 2025-04-16 DIAGNOSIS — E06.3 HYPOTHYROIDISM DUE TO HASHIMOTO'S THYROIDITIS: ICD-10-CM

## 2025-04-16 DIAGNOSIS — E10.65 TYPE 1 DIABETES MELLITUS WITH HYPERGLYCEMIA: Primary | ICD-10-CM

## 2025-04-16 DIAGNOSIS — E78.2 MIXED HYPERLIPIDEMIA: ICD-10-CM

## 2025-04-16 DIAGNOSIS — Z96.41 INSULIN PUMP STATUS: ICD-10-CM

## 2025-04-16 DIAGNOSIS — E55.9 VITAMIN D DEFICIENCY: ICD-10-CM

## 2025-04-16 PROCEDURE — 99214 OFFICE O/P EST MOD 30 MIN: CPT | Performed by: INTERNAL MEDICINE

## 2025-04-16 NOTE — PATIENT INSTRUCTIONS
Keep up the good work!  See eye doctor in Oct.  Continue diet.  Increase exercise. Do stretching 2x/d.  Continue same pump settings. Bolus before eating.  Continue vit D supplements.  Call if blood sugars are running under 100 or over 200.  F/u in 6 months, with labs prior.

## 2025-04-28 NOTE — PROGRESS NOTES
[facility]        Patient Care Team:  Jarad Valente Jr., DO as PCP - General (Sports Medicine)  Carol Schreiber MD as Consulting Physician (Obstetrics and Gynecology)  Neyda Green MD as Consulting Physician (Endocrinology)    Chief Complaint:    Chief Complaint   Patient presents with    Diabetes     F/u DM 1, bs 126 4hpp         Subjective   Here for diabetes f/u  Blood sugars: high 100's  Insulin delivery per pump: Basal 72%/ bolus 28%  97% auto mode  Exercise program: not much  Taking vit D    Interval History:     Patient Complaints:  R hip pain  Patient Denies: hypoglycemia  History taken from: patient    Review of Systems:   Review of Systems   HENT:  Negative for trouble swallowing.    Eyes:  Negative for blurred vision.   Cardiovascular:  Negative for palpitations and leg swelling.   Gastrointestinal:  Negative for nausea.   Endocrine: Negative for polyuria.   Musculoskeletal:  Positive for gait problem.   Neurological:  Negative for tremors and headache.   Lost  8 lb since last visit    Objective     Vital Signs     Vitals:    04/16/25 1255   BP: 118/62   Pulse: 80   SpO2: 98%         Physical Exam:     General Appearance:    Alert, cooperative, in no acute distress   Head:    Normocephalic, without obvious abnormality, atraumatic   Eyes:            Lids and lashes normal, conjunctivae and sclerae normal, no   icterus, no pallor, corneas clear, PERRLA   Throat:   No oral lesions,  oral mucosa moist   Neck:   No adenopathy, supple,  no thyromegaly, no carotid bruit   Lungs:     Clear    Heart:    Regular rhythm and normal rate   Chest Wall:    No abnormalities observed   Abdomen:     Normal bowel sounds, soft                 Extremities:   Moves all extremities well, no edema               Pulses:   Pulses palpable and equal bilaterally   Skin:   Pump site clean   Neurologic:  DTR absent, able to feel the 10g monofilament          Results Review:    I have  reviewed the patient's new clinical results, labs & imaging.    Medication Review:   Prior to Admission medications    Medication Sig Start Date End Date Taking? Authorizing Provider   Continuous Glucose Sensor (Dexcom G6 Sensor) Use Every 10 (Ten) Days. 5/23/24  Yes Neyda Green MD   Continuous Glucose Transmitter (Dexcom G6 Transmitter) misc Use 1 each Every 3 (Three) Months. E10.65 10/16/24  Yes Neyda Green MD   fluticasone (CUTIVATE) 0.05 % cream APPLY SPARINGLY TWICE A DAY AS NEEDED TO THE AFFECTED AREA ON CHEST AND UNDER BREASTS 7/15/20  Yes Lexie Pham MD   glucose blood (Accu-Chek Guide) test strip To check blood sugar once a day 8/28/24  Yes Neyda Green MD   HumaLOG 100 UNIT/ML injection INJECT A MAXIMUM DAILY DOSE OF 60 UNITS SUBCUTANEOUSLY VIA INSULIN PUMP 8/9/24  Yes Neyda Green MD   hydrocortisone 2.5 % cream APPLY TO AFFECTED SITES UNDER BREASTS AS NEEDED FOR ITCHING. STOP UPON IMPROVEMENT. KEEP ON HAND FOR FLARES 2/23/23  Yes Lexie Pham MD   Insulin Disposable Pump (Omnipod 5 G6 Pods, Gen 5,) misc CHANGE POD EVERY 3 DAYS 6/6/24  Yes Neyda Green MD   levothyroxine (SYNTHROID, LEVOTHROID) 25 MCG tablet TAKE 1 TABLET BY MOUTH EVERY MORNING 1/29/25  Yes Neyda Green MD   mupirocin (BACTROBAN) 2 % ointment APPLY TWICE DAILY X 10DAYS TO AXILLAE,UMBILICUS,GROIN AND PERIANAL AREAS 7/21/20  Yes Lexie Pham MD   Nizoral 1 % shampoo  1/4/24  Yes Lexie Pham MD   nystatin 609328 UNIT/GM topical powder  1/10/24  Yes Lexie Pham MD   ondansetron ODT (ZOFRAN-ODT) 4 MG disintegrating tablet Place 1 tablet on the tongue Every 6 (Six) Hours As Needed for Nausea or Vomiting. 12/27/24  Yes Bassem Marlow MD   rosuvastatin (CRESTOR) 20 MG tablet Take 1 tablet by mouth Daily. 1/29/25  Yes Neyda Green MD   valACYclovir (VALTREX) 500 MG tablet Take 1 tablet by mouth Daily. 11/9/22  Yes Carol Schreiber MD   vitamin D  (ERGOCALCIFEROL) 1.25 MG (16209 UT) capsule capsule TAKE 1 CAPSULE ONCE WEEKLY 1/6/25  Yes Neyda Green MD         Lab Results   Component Value Date    HGBA1C 8.5 (H) 08/16/2024    HGBA1C 8.3 (H) 11/07/2022    HGBA1C 10.7 (H) 04/27/2022      Lab Results   Component Value Date    GLUCOSE 174 (H) 08/16/2024    BUN 11 08/16/2024    CREATININE 0.90 08/16/2024    EGFRIFNONA 82 10/28/2021    EGFRIFAFRI 95 10/28/2021    BCR 12 08/16/2024    K 4.8 08/16/2024    CO2 23 08/16/2024    CALCIUM 9.1 08/16/2024    ALBUMIN 3.6 (L) 08/16/2024    AST 21 08/16/2024    ALT 13 08/16/2024     (H) 11/07/2022    HDL 46 11/07/2022    TRIG 138 11/07/2022     Lab Results   Component Value Date    TSH 3.980 08/16/2024    FREET4 1.42 08/16/2024    KMZM24LA 33.7 11/07/2022     A1C 8.3%; alb/cr ratio 3; cr 0.87, K 4.6, ALT 17; vit D level 51.6;  Chol 133, Trigl 50, LDL 74, HDL 48; TSH 2.6, FreeT4 1.25 on 4/4/2025    Assessment & Plan     Diagnoses and all orders for this visit:    1. Type 1 diabetes mellitus with hyperglycemia (Primary) - improved  -     Hemoglobin A1c; Future  -     Hemoglobin A1c    2. Mixed hyperlipidemia - stable  -     Comprehensive Metabolic Panel; Future  -     Comprehensive Metabolic Panel    3. Hypothyroidism due to Hashimoto's thyroiditis - stable    4. Vitamin D deficiency - stable    5. Insulin pump status    Wearing an OmniPod 5 insulin pump since July 2022.  Pt has agreed to wear the CGM device and share readings on a regular basis with our office.    See eye doctor in Oct.  Continue diet.  Increase exercise. Do stretching 2x/d.  Continue same pump settings. Bolus before eating.  Continue rosuvastatin, levothyroxine & vit D supplements.  Call if blood sugars are running under 100 or over 200.        Neyda Green MD  04/28/25  16:37 EDT

## 2025-05-22 DIAGNOSIS — E10.65 TYPE 1 DIABETES MELLITUS WITH HYPERGLYCEMIA: ICD-10-CM

## 2025-05-23 RX ORDER — INSULIN LISPRO 100 [IU]/ML
INJECTION, SOLUTION INTRAVENOUS; SUBCUTANEOUS
Qty: 20 ML | Refills: 0 | Status: SHIPPED | OUTPATIENT
Start: 2025-05-23

## 2025-05-23 RX ORDER — PROCHLORPERAZINE 25 MG/1
1 SUPPOSITORY RECTAL
Qty: 1 EACH | Refills: 3 | Status: SHIPPED | OUTPATIENT
Start: 2025-05-23

## 2025-05-23 RX ORDER — INSULIN PMP CART,AUT,G6/7,CNTR
1 EACH SUBCUTANEOUS
Qty: 30 EACH | Refills: 2 | Status: SHIPPED | OUTPATIENT
Start: 2025-05-23

## 2025-05-23 RX ORDER — PROCHLORPERAZINE 25 MG/1
SUPPOSITORY RECTAL
Qty: 9 EACH | Refills: 3 | Status: SHIPPED | OUTPATIENT
Start: 2025-05-23

## 2025-06-04 ENCOUNTER — PATIENT MESSAGE (OUTPATIENT)
Dept: SPORTS MEDICINE | Facility: CLINIC | Age: 50
End: 2025-06-04
Payer: COMMERCIAL

## 2025-06-04 DIAGNOSIS — E10.65 TYPE 1 DIABETES MELLITUS WITH HYPERGLYCEMIA: ICD-10-CM

## 2025-06-04 DIAGNOSIS — E78.2 MIXED HYPERLIPIDEMIA: ICD-10-CM

## 2025-06-04 DIAGNOSIS — E10.65 TYPE 1 DIABETES MELLITUS WITH HYPERGLYCEMIA: Primary | ICD-10-CM

## 2025-06-04 DIAGNOSIS — E55.9 VITAMIN D DEFICIENCY: ICD-10-CM

## 2025-06-04 DIAGNOSIS — Z00.00 ENCOUNTER FOR ANNUAL PHYSICAL EXAM: Primary | ICD-10-CM

## 2025-06-04 DIAGNOSIS — E06.3 HYPOTHYROIDISM DUE TO HASHIMOTO'S THYROIDITIS: ICD-10-CM

## 2025-06-04 DIAGNOSIS — Z96.41 INSULIN PUMP STATUS: ICD-10-CM

## 2025-06-04 RX ORDER — INSULIN LISPRO 100 [IU]/ML
INJECTION, SOLUTION INTRAVENOUS; SUBCUTANEOUS
Qty: 60 ML | Refills: 3 | Status: SHIPPED | OUTPATIENT
Start: 2025-06-04

## 2025-06-06 LAB
ALBUMIN SERPL-MCNC: 4 G/DL (ref 3.9–4.9)
ALBUMIN/CREAT UR: 3 MG/G CREAT (ref 0–29)
ALP SERPL-CCNC: 87 IU/L (ref 44–121)
ALT SERPL-CCNC: 26 IU/L (ref 0–32)
AST SERPL-CCNC: 32 IU/L (ref 0–40)
BASOPHILS # BLD AUTO: 0.1 X10E3/UL (ref 0–0.2)
BASOPHILS NFR BLD AUTO: 2 %
BILIRUB SERPL-MCNC: 0.5 MG/DL (ref 0–1.2)
BUN SERPL-MCNC: 24 MG/DL (ref 6–24)
BUN/CREAT SERPL: 28 (ref 9–23)
CALCIUM SERPL-MCNC: 9 MG/DL (ref 8.7–10.2)
CHLORIDE SERPL-SCNC: 103 MMOL/L (ref 96–106)
CHOLEST SERPL-MCNC: 149 MG/DL (ref 100–199)
CO2 SERPL-SCNC: 24 MMOL/L (ref 20–29)
CREAT SERPL-MCNC: 0.87 MG/DL (ref 0.57–1)
CREAT UR-MCNC: 132.9 MG/DL
EGFRCR SERPLBLD CKD-EPI 2021: 83 ML/MIN/1.73
EOSINOPHIL # BLD AUTO: 0.1 X10E3/UL (ref 0–0.4)
EOSINOPHIL NFR BLD AUTO: 1 %
ERYTHROCYTE [DISTWIDTH] IN BLOOD BY AUTOMATED COUNT: 11.9 % (ref 11.7–15.4)
GLOBULIN SER CALC-MCNC: 2.5 G/DL (ref 1.5–4.5)
GLUCOSE SERPL-MCNC: 184 MG/DL (ref 70–99)
HBA1C MFR BLD: 8.4 % (ref 4.8–5.6)
HCT VFR BLD AUTO: 40.5 % (ref 34–46.6)
HDLC SERPL-MCNC: 56 MG/DL
HGB BLD-MCNC: 12.8 G/DL (ref 11.1–15.9)
IMM GRANULOCYTES # BLD AUTO: 0 X10E3/UL (ref 0–0.1)
IMM GRANULOCYTES NFR BLD AUTO: 0 %
LDLC SERPL CALC-MCNC: 83 MG/DL (ref 0–99)
LYMPHOCYTES # BLD AUTO: 2.4 X10E3/UL (ref 0.7–3.1)
LYMPHOCYTES NFR BLD AUTO: 30 %
MCH RBC QN AUTO: 29.6 PG (ref 26.6–33)
MCHC RBC AUTO-ENTMCNC: 31.6 G/DL (ref 31.5–35.7)
MCV RBC AUTO: 94 FL (ref 79–97)
MICROALBUMIN UR-MCNC: 4.5 UG/ML
MONOCYTES # BLD AUTO: 0.6 X10E3/UL (ref 0.1–0.9)
MONOCYTES NFR BLD AUTO: 8 %
NEUTROPHILS # BLD AUTO: 4.6 X10E3/UL (ref 1.4–7)
NEUTROPHILS NFR BLD AUTO: 59 %
PLATELET # BLD AUTO: 315 X10E3/UL (ref 150–450)
POTASSIUM SERPL-SCNC: 4.4 MMOL/L (ref 3.5–5.2)
PROT SERPL-MCNC: 6.5 G/DL (ref 6–8.5)
RBC # BLD AUTO: 4.33 X10E6/UL (ref 3.77–5.28)
SODIUM SERPL-SCNC: 139 MMOL/L (ref 134–144)
T4 FREE SERPL-MCNC: 1.28 NG/DL (ref 0.82–1.77)
TRIGL SERPL-MCNC: 48 MG/DL (ref 0–149)
TSH SERPL DL<=0.005 MIU/L-ACNC: 3.61 UIU/ML (ref 0.45–4.5)
VLDLC SERPL CALC-MCNC: 10 MG/DL (ref 5–40)
WBC # BLD AUTO: 7.9 X10E3/UL (ref 3.4–10.8)

## 2025-06-10 ENCOUNTER — OFFICE VISIT (OUTPATIENT)
Dept: SPORTS MEDICINE | Facility: CLINIC | Age: 50
End: 2025-06-10
Payer: COMMERCIAL

## 2025-06-10 VITALS
HEIGHT: 66 IN | BODY MASS INDEX: 25.39 KG/M2 | DIASTOLIC BLOOD PRESSURE: 64 MMHG | WEIGHT: 158 LBS | RESPIRATION RATE: 16 BRPM | HEART RATE: 79 BPM | SYSTOLIC BLOOD PRESSURE: 112 MMHG | OXYGEN SATURATION: 99 %

## 2025-06-10 DIAGNOSIS — E78.2 MIXED HYPERLIPIDEMIA: ICD-10-CM

## 2025-06-10 DIAGNOSIS — M25.551 PAIN OF RIGHT HIP: ICD-10-CM

## 2025-06-10 DIAGNOSIS — Z00.00 ENCOUNTER FOR ANNUAL PHYSICAL EXAM: Primary | ICD-10-CM

## 2025-06-10 DIAGNOSIS — Z23 IMMUNIZATION DUE: ICD-10-CM

## 2025-06-10 DIAGNOSIS — E10.65 TYPE 1 DIABETES MELLITUS WITH HYPERGLYCEMIA: ICD-10-CM

## 2025-06-10 PROCEDURE — 99396 PREV VISIT EST AGE 40-64: CPT | Performed by: FAMILY MEDICINE

## 2025-06-10 PROCEDURE — 90471 IMMUNIZATION ADMIN: CPT | Performed by: FAMILY MEDICINE

## 2025-06-10 PROCEDURE — 90677 PCV20 VACCINE IM: CPT | Performed by: FAMILY MEDICINE

## 2025-06-10 NOTE — PROGRESS NOTES
"Luis Wilcox presents for an annual physical exam.     Type 1 diabetes.  Insulin pump.  Transitioning to a new endocrinologist.  Hypothyroidism.  Follows with endocrinology.  Up-to-date with GYN.  GYN has been managing her Cologuard orders.  Lost 50# thru [facility].  Intermittent R hip pain, decrease ROM. Would like to try PT.    I have reviewed the patient's medical, family, and social history in detail and updated the computerized patient record.    Health Maintenance   Topic Date Due    Hepatitis B (1 of 3 - 19+ 3-dose series) Never done    HEPATITIS C SCREENING  Never done    Pneumococcal Vaccine 0-49 (2 of 2 - PCV) 07/17/2019    COVID-19 Vaccine (4 - 2024-25 season) 09/01/2024    COLORECTAL CANCER SCREENING  11/28/2025    DIABETIC FOOT EXAM  07/01/2035 (Originally 11/14/2023)    DIABETIC EYE EXAM  07/01/2035 (Originally 10/6/2024)    INFLUENZA VACCINE  07/01/2025    HEMOGLOBIN A1C  12/05/2025    Annual Gynecologic Pelvic and Breast Exam  04/03/2026    LIPID PANEL  06/05/2026    URINE MICROALBUMIN-CREATININE RATIO (uACR)  06/05/2026    ANNUAL PHYSICAL  06/10/2026    MAMMOGRAM  05/03/2027    PAP SMEAR  04/02/2028    TDAP/TD VACCINES (2 - Td or Tdap) 07/17/2028    PT PLAN OF CARE  Discontinued         /64 (BP Location: Left arm, Patient Position: Sitting, Cuff Size: Adult)   Pulse 79   Resp 16   Ht 167.6 cm (65.98\")   Wt 71.7 kg (158 lb)   SpO2 99%   BMI 25.51 kg/m²      Physical Exam    Vital signs reviewed.  General appearance: No acute distress  Eyes: conjunctiva clear without erythema; pupils equally round and reactive  ENT: external ears and nose normal; hearing normal   Neck: supple; no thyromegaly  CV: normal rate and rhythm; no peripheral edema  Respiratory: normal respiratory effort; lungs clear to auscultation bilaterally  MSK: normal gait and station; no focal joint deformity or swelling  Skin: no rash or wounds; normal turgor  Neuro: cranial nerves 2-12 grossly intact; normal " sensation to light touch  Psych: mood and affect normal; recent and remote memory intact    Orders Only on 06/04/2025   Component Date Value Ref Range Status    Glucose 06/05/2025 184 (H)  70 - 99 mg/dL Final    BUN 06/05/2025 24  6 - 24 mg/dL Final    Creatinine 06/05/2025 0.87  0.57 - 1.00 mg/dL Final    EGFR Result 06/05/2025 83  >59 mL/min/1.73 Final    BUN/Creatinine Ratio 06/05/2025 28 (H)  9 - 23 Final    Sodium 06/05/2025 139  134 - 144 mmol/L Final    Potassium 06/05/2025 4.4  3.5 - 5.2 mmol/L Final    Chloride 06/05/2025 103  96 - 106 mmol/L Final    Total CO2 06/05/2025 24  20 - 29 mmol/L Final    Calcium 06/05/2025 9.0  8.7 - 10.2 mg/dL Final    Total Protein 06/05/2025 6.5  6.0 - 8.5 g/dL Final    Albumin 06/05/2025 4.0  3.9 - 4.9 g/dL Final    Globulin 06/05/2025 2.5  1.5 - 4.5 g/dL Final    Total Bilirubin 06/05/2025 0.5  0.0 - 1.2 mg/dL Final    Alkaline Phosphatase 06/05/2025 87  44 - 121 IU/L Final    AST (SGOT) 06/05/2025 32  0 - 40 IU/L Final    ALT (SGPT) 06/05/2025 26  0 - 32 IU/L Final    Hemoglobin A1C 06/05/2025 8.4 (H)  4.8 - 5.6 % Final    Comment:          Prediabetes: 5.7 - 6.4           Diabetes: >6.4           Glycemic control for adults with diabetes: <7.0      WBC 06/05/2025 7.9  3.4 - 10.8 x10E3/uL Final    RBC 06/05/2025 4.33  3.77 - 5.28 x10E6/uL Final    Hemoglobin 06/05/2025 12.8  11.1 - 15.9 g/dL Final    Hematocrit 06/05/2025 40.5  34.0 - 46.6 % Final    MCV 06/05/2025 94  79 - 97 fL Final    MCH 06/05/2025 29.6  26.6 - 33.0 pg Final    MCHC 06/05/2025 31.6  31.5 - 35.7 g/dL Final    RDW 06/05/2025 11.9  11.7 - 15.4 % Final    Platelets 06/05/2025 315  150 - 450 x10E3/uL Final    Neutrophil Rel % 06/05/2025 59  Not Estab. % Final    Lymphocyte Rel % 06/05/2025 30  Not Estab. % Final    Monocyte Rel % 06/05/2025 8  Not Estab. % Final    Eosinophil Rel % 06/05/2025 1  Not Estab. % Final    Basophil Rel % 06/05/2025 2  Not Estab. % Final    Neutrophils Absolute 06/05/2025 4.6   1.4 - 7.0 x10E3/uL Final    Lymphocytes Absolute 06/05/2025 2.4  0.7 - 3.1 x10E3/uL Final    Monocytes Absolute 06/05/2025 0.6  0.1 - 0.9 x10E3/uL Final    Eosinophils Absolute 06/05/2025 0.1  0.0 - 0.4 x10E3/uL Final    Basophils Absolute 06/05/2025 0.1  0.0 - 0.2 x10E3/uL Final    Immature Granulocyte Rel % 06/05/2025 0  Not Estab. % Final    Immature Grans Absolute 06/05/2025 0.0  0.0 - 0.1 x10E3/uL Final    Creatinine, Urine 06/05/2025 132.9  Not Estab. mg/dL Final    Microalbumin, Urine 06/05/2025 4.5  Not Estab. ug/mL Final    Microalbumin/Creatinine Ratio 06/05/2025 3  0 - 29 mg/g creat Final    Comment:                        Normal:                0 -  29                         Moderately increased: 30 - 300                         Severely increased:       >300      Total Cholesterol 06/05/2025 149  100 - 199 mg/dL Final    Triglycerides 06/05/2025 48  0 - 149 mg/dL Final    HDL Cholesterol 06/05/2025 56  >39 mg/dL Final    VLDL Cholesterol Ant 06/05/2025 10  5 - 40 mg/dL Final    LDL Chol Calc (NIH) 06/05/2025 83  0 - 99 mg/dL Final    Free T4 06/05/2025 1.28  0.82 - 1.77 ng/dL Final    TSH 06/05/2025 3.610  0.450 - 4.500 uIU/mL Final        Diagnoses and all orders for this visit:    1. Encounter for annual physical exam (Primary)    2. Type 1 diabetes mellitus with hyperglycemia    3. Mixed hyperlipidemia    4. Immunization due  -     Pneumococcal Conjugate Vaccine 20-Valent All    5. Pain of right hip  -     Ambulatory Referral to Physical Therapy for Evaluation & Treatment        Encourage healthy diet and exercise.  Encourage patient to stay up to date on screening examinations as indicated based on age and risk factors.

## 2025-06-10 NOTE — LETTER
[facility]  Vaccine Consent Form    Patient Name:  Luis Wilcox  Patient :  1975     Vaccine(s) Ordered    Pneumococcal Conjugate Vaccine 20-Valent All        Screening Checklist  The following questions should be completed prior to vaccination. If you answer “yes” to any question, it does not necessarily mean you should not be vaccinated. It just means we may need to clarify or ask more questions. If a question is unclear, please ask your healthcare provider to explain it.    Yes No   Any fever or moderate to severe illness today (mild illness and/or antibiotic treatment are not contraindications)?     Do you have a history of a serious reaction to any previous vaccinations, such as anaphylaxis, encephalopathy within 7 days, Guillain-[place] syndrome within 6 weeks, seizure?     Have you received any live vaccine(s) (e.g MMR, YOLA) or any other vaccines in the last month (to ensure duplicate doses aren't given)?     Do you have an anaphylactic allergy to latex (DTaP, DTaP-IPV, Hep A, Hep B, MenB, RV, Td, Tdap), baker’s yeast (Hep B, HPV), polysorbates (RSV, nirsevimab, PCV 20 and 21, Rotavirrus, Tdap, Shingrix), or gelatin (YOLA, MMR)?     Do you have an anaphylactic allergy to neomycin (Rabies, YOLA, MMR, IPV, Hep A), polymyxin B (IPV), or streptomycin (IPV)?      Any cancer, leukemia, AIDS, or other immune system disorder? (YOLA, MMR, RV)     Do you have a parent, brother, or sister with an immune system problem (if immune competence of vaccine recipient clinically verified, can proceed)? (MMR, YOLA)     Any recent steroid treatments for >2 weeks, chemotherapy, or radiation treatment? (YOLA, MMR)     Have you received antibody-containing blood transfusions or IVIG in the past 11 months (recommended interval is dependent on product)? (MMR, YOLA)     Have you taken antiviral drugs (acyclovir, famciclovir, valacyclovir for YOLA) in the last 24 or 48 hours, respectively?      Are you pregnant or planning to  "become pregnant within 1 month? (YOLA, MMR, HPV, IPV, MenB, Abrexvy; For Hep B- refer to Engerix-B; For RSV - Abrysvo is indicated for 32-36 weeks of pregnancy from September to January)     For infants, have you ever been told your child has had intussusception or a medical emergency involving obstruction of the intestine (Rotavirus)? If not for an infant, can skip this question.         *Ordering Physicians/APC should be consulted if \"yes\" is checked by the patient or guardian above.  I have received, read, and understand the Vaccine Information Statement (VIS) for each vaccine ordered.  I have considered my or my child's health status as well as the health status of my close contacts.  I have taken the opportunity to discuss my vaccine questions with my or my child's health care provider.   I have requested that the ordered vaccine(s) be given to me or my child.  I understand the benefits and risks of the vaccines.  I understand that I should remain in the clinic for 15 minutes after receiving the vaccine(s).  _________________________________________________________  Signature of Patient or Parent/Legal Guardian ____________________  Date     "

## 2025-06-23 ENCOUNTER — TREATMENT (OUTPATIENT)
Dept: PHYSICAL THERAPY | Facility: CLINIC | Age: 50
End: 2025-06-23
Payer: COMMERCIAL

## 2025-06-23 DIAGNOSIS — M25.551 PAIN OF RIGHT HIP: Primary | ICD-10-CM

## 2025-06-23 NOTE — PROGRESS NOTES
Physical Therapy Initial Evaluation and Plan of Care    Patient: Luis Wilcox   : 1975  Diagnosis/ICD-10 Code:  Pain of right hip [M25.551]  Referring practitioner: Jarad Valente *  Date of Initial Visit: 2025  Today's Date: 2025  Patient seen for 1 session         Visit Diagnoses:    ICD-10-CM ICD-9-CM   1. Pain of right hip  M25.551 719.45         Subjective Questionnaire: LEFS: 79/80      Subjective Evaluation    History of Present Illness  Mechanism of injury: Right hip pain of about 6 months duration. No known trauma. Difficulty crossing leg to don/doff socks. Some soreness when sitting with legs crossed at her desk. Occasional pain into posterior thigh. No numbness/tingling. Just started back to walking 3 miles on a treadmill.       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  Relieving factors: change in position and rest  Exacerbated by: crossing legs.  Progression: no change    Social Support  Lives in: multiple-level home    Diagnostic Tests  No diagnostic tests performed    Patient Goals  Patient goals for therapy: decreased pain  Patient goal: don/doff socks without difficulty           Objective          Static Posture     Comments  Pelvic alignment WNL    Palpation     Right   No palpable tenderness to the gluteus vesta, gluteus medius, iliopsoas, lumbar paraspinals, proximal biceps femoris, proximal semimembranosus, proximal semitendinosus, quadratus lumborum and TFL.   Hypertonic in the piriformis. Tenderness of the piriformis.     Tenderness     Left Hip   No tenderness in the ASIS, PSIS, greater trochanter and sacroiliac joint.     Right Hip   No tenderness in the ASIS, PSIS, greater trochanter and sacroiliac joint.     Neurological Testing     Sensation     Hip   Left Hip   Intact: light  touch    Right Hip   Intact: light touch    Active Range of Motion   Left Hip   Normal active range of motion    Right Hip   Normal active range of motion    Strength/Myotome Testing     Left Hip   Planes of Motion   Flexion: 4+  Extension: 4  Abduction: 4  Adduction: 4+  External rotation: 4  Internal rotation: 4    Right Hip   Planes of Motion   Flexion: 4+  Extension: 4  Abduction: 4  Adduction: 4+  External rotation: 4  Internal rotation: 4    Left Knee   Flexion: 5  Extension: 5    Right Knee   Flexion: 5  Extension: 5    Tests       Thoracic   Negative slump.     Lumbar     Left   Negative passive SLR and quadrant.     Right   Negative passive SLR and quadrant.     Left Pelvic Girdle/Sacrum   Negative: sacral spring.     Right Pelvic Girdle/Sacrum   Negative: sacral spring.     Left Hip   Negative CLAUS, Mark, piriformis and scour.   Khadar: Positive.   90/90 SLR: Positive.     Right Hip   Positive CLAUS and piriformis.   Negative Mark and scour.   Khadar: Positive.   90/90 SLR: Positive.           Assessment & Plan       Assessment  Impairments: activity intolerance, impaired physical strength, lacks appropriate home exercise program and pain with function   Functional limitations: lifting, sleeping, walking, uncomfortable because of pain, moving in bed and sitting   Assessment details: Pt presents with right posterior hip/thigh pain, decreased hip flexibility, core weakness and decreased activity tolerance. She will benefit from skilled PT services in order to address impairments and facilitate return to baseline performance of daily activities including ADL's, work and recreational activities.    Prognosis: good    Goals  Plan Goals: Short Term Goals: 6 weeks. Patient will:  1. Be independent with initial HEP  2. Be instructed in posture and body mechanics  3. Demonstrate TrA contraction during exercises in clinic without need for cueing.    Long Term Goals: 12 weeks. Patient will:  1. Demonstrate improved  Bilateral lower extremity/core MMT of  >/= 4+/5 to allow for return to recreational/daily activities without increased pain.  2. Demonstrate normal lower extremity flexibility to allow for walking/ADL's/performance of household tasks without pain.  3. Report ability to complete workday with </= 1/10 pain.  4. Report ability to don/doff socks without difficulty.    Plan  Therapy options: will be seen for skilled therapy services  Planned modality interventions: cryotherapy, thermotherapy (hydrocollator packs), TENS, ultrasound and traction  Planned therapy interventions: abdominal trunk stabilization, manual therapy, neuromuscular re-education, body mechanics training, postural training, soft tissue mobilization, spinal/joint mobilization, strengthening, stretching, home exercise program, functional ROM exercises and flexibility  Duration in weeks: 12  Treatment plan discussed with: patient        History # of Personal Factors and/or Comorbidities: MODERATE (1-2)  Examination of Body System(s): # of elements: LOW (1-2)  Clinical Presentation: STABLE   Clinical Decision Making: LOW       Timed:         Manual Therapy:    0     mins  10929;     Therapeutic Exercise:    15     mins  84854;     Neuromuscular Miguel A:    0    mins  39038;    Therapeutic Activity:     0     mins  89127;     Gait Trainin     mins  47872;     Ultrasound:     0     mins  70322;    Ionto                               0    mins   94703  Self Care                       10     mins   06564      Un-Timed:  Electrical Stimulation:    0     mins  59891 ( );  Dry Needling     0     mins self-pay  Traction     0     mins 25006  Low Eval     20     Mins 34356  Mod Eval     0     Mins 77135  High Eval                       0     Mins 20928  Re-Eval     0     Mins 99394  Canalith Repos    0     mins 60216      Timed Treatment:   25   mins   Total Treatment:     45   mins          PT: Carol Coffey, PT     License Number:  [id number]  Electronically signed by Carol Coffey, PT, 06/23/25, 12:29 PM EDT    Certification Period: 6/23/2025 thru 9/20/2025  I certify that the therapy services are furnished while this patient is under my care.  The services outlined above are required by this patient, and will be reviewed every 90 days.         Physician Signature:__________________________________________________    PHYSICIAN: Jarad Valente Jr.,   NPI: [id number]                                      DATE:      Please sign and return via fax to [email or web address] . Thank you, [facility] Physical [facility].

## 2025-07-09 ENCOUNTER — TREATMENT (OUTPATIENT)
Dept: PHYSICAL THERAPY | Facility: CLINIC | Age: 50
End: 2025-07-09
Payer: COMMERCIAL

## 2025-07-09 DIAGNOSIS — M25.551 PAIN OF RIGHT HIP: Primary | ICD-10-CM

## 2025-07-09 NOTE — PROGRESS NOTES
Physical Therapy Daily Treatment Note      Patient: Luis Wilcox   : 1975  Referring practitioner: Jarad Valente *  Date of Initial Visit: Type: THERAPY  Noted: 2025  Today's Date: 2025  Patient seen for 2 sessions       Visit Diagnoses:    ICD-10-CM ICD-9-CM   1. Pain of right hip  M25.551 719.45       Subjective   Pt states she hasn't had a chance to do home exercises. Symptoms are about the same.     Objective   See Exercise, Manual, and Modality Logs for complete treatment.       Assessment/Plan    Reviewed previous exercises to ensure proper form, and added new hip strengthening exercises. No pain with any exercise. Encouraged pt to be consistent with HEP. Will benefit from continued progression of hip and core strengthening exercise to improve gait mechanics and reduce hip pain.         Timed:         Manual Therapy:    0     mins  02420;     Therapeutic Exercise:    10     mins  11831;     Neuromuscular Miguel A:    10    mins  81814;    Therapeutic Activity:     8     mins  77301;     Gait Trainin     mins  46045;     Ultrasound:     0     mins  38885;    Ionto                               0    mins   27085  Self Care                       0     mins   89899  Canalith Repos    0     mins  89181      Un-Timed:  Electrical Stimulation:    0     mins  65739 ( );  Dry Needling     0     mins self-pay  Traction     0     mins 67661      Timed Treatment:   28   mins   Total Treatment:     28   mins    Carol Coffey, PT  [place] License: [id number]

## 2025-07-16 ENCOUNTER — TREATMENT (OUTPATIENT)
Dept: PHYSICAL THERAPY | Facility: CLINIC | Age: 50
End: 2025-07-16
Payer: COMMERCIAL

## 2025-07-16 DIAGNOSIS — M25.551 PAIN OF RIGHT HIP: Primary | ICD-10-CM

## 2025-07-16 NOTE — PROGRESS NOTES
Physical Therapy Re Assessment  Patient: Luis Wilcox   : 1975  Diagnosis/ICD-10 Code:  Pain of right hip [M25.551]  Referring practitioner: Jarad Valente *  Date of Initial Visit: Type: THERAPY  Noted: 2025  Today's Date: 2025  Patient seen for 3 sessions         Visit Diagnoses:    ICD-10-CM ICD-9-CM   1. Pain of right hip  M25.551 719.45         Luis Wilcox reports: her symptoms haven't changed much, states she hasn't been consistent with HEP. Does note some improvement in ability to don/doff socks, but still difficult first thing in the morning.   Subjective Questionnaire: LEFS: 66/80  Clinical Progress: unchanged  Home Program Compliance: intermittent  Treatment has included: therapeutic exercise, neuromuscular re-education, and therapeutic activity      Subjective       Objective     Strength/Myotome Testing      Left Hip   Planes of Motion   Flexion: 4+  Extension: 4  Abduction: 4  Adduction: 4+  External rotation: 4  Internal rotation: 4     Right Hip   Planes of Motion   Flexion: 4+  Extension: 4  Abduction: 4  Adduction: 4+  External rotation: 4  Internal rotation: 4     Left Knee   Flexion: 5  Extension: 5     Right Knee   Flexion: 5  Extension: 5    Assessment/Plan    Short Term Goals: 6 weeks. Patient will:  1. Be independent with initial HEP (MET)  2. Be instructed in posture and body mechanics (MET)  3. Demonstrate TrA contraction during exercises in clinic without need for cueing. (NOT MET)     Long Term Goals: 12 weeks. Patient will:  1. Demonstrate improved Bilateral lower extremity/core MMT of  >/= 4+/5 to allow for return to recreational/daily activities without increased pain. (NOT MET)  2. Demonstrate normal lower extremity flexibility to allow for walking/ADL's/performance of household tasks without pain. (NOT MET)  3. Report ability to complete workday with </= 1/10 pain. (PROGRESSING)  4. Report ability to don/doff socks without difficulty.  (PROGRESSING)     Recommendations: Continue as planned  Timeframe: 1 month  Prognosis to achieve goals: good if adherence with HEP improves      Timed:         Manual Therapy:    0     mins  12529;     Therapeutic Exercise:    10     mins  19044;     Neuromuscular Miguel A:    10    mins  57532;    Therapeutic Activity:     10     mins  06833;     Gait Trainin     mins  71058;     Ultrasound:     0     mins  62947;    Ionto                               0    mins   18283  Self Care                       0     mins   27640  Canalith Repos    0     mins 66012      Un-Timed:  Electrical Stimulation:    0     mins  59145 ( );  Dry Needling     0     mins self-pay  Traction     0     mins 05343  Re-Eval                         0      mins  91606      Timed Treatment:   30   mins   Total Treatment:     30   mins          PT: Carol Coffey, PT     [place] License:  [id number]    Electronically signed by Carol Coffey PT, 25, 12:03 PM EDT

## 2025-08-19 DIAGNOSIS — E06.3 HYPOTHYROIDISM DUE TO HASHIMOTO'S THYROIDITIS: ICD-10-CM

## 2025-08-20 RX ORDER — LEVOTHYROXINE SODIUM 25 MCG
25 TABLET ORAL EVERY MORNING
Qty: 90 TABLET | Refills: 3 | Status: SHIPPED | OUTPATIENT
Start: 2025-08-20